# Patient Record
Sex: MALE | Race: AMERICAN INDIAN OR ALASKA NATIVE | NOT HISPANIC OR LATINO
[De-identification: names, ages, dates, MRNs, and addresses within clinical notes are randomized per-mention and may not be internally consistent; named-entity substitution may affect disease eponyms.]

---

## 2022-01-31 ENCOUNTER — APPOINTMENT (OUTPATIENT)
Dept: COLORECTAL SURGERY | Facility: CLINIC | Age: 87
End: 2022-01-31
Payer: MEDICARE

## 2022-01-31 VITALS
SYSTOLIC BLOOD PRESSURE: 117 MMHG | HEART RATE: 98 BPM | WEIGHT: 105 LBS | DIASTOLIC BLOOD PRESSURE: 73 MMHG | BODY MASS INDEX: 16.48 KG/M2 | HEIGHT: 67 IN | TEMPERATURE: 98.8 F

## 2022-01-31 DIAGNOSIS — Z86.79 PERSONAL HISTORY OF OTHER DISEASES OF THE CIRCULATORY SYSTEM: ICD-10-CM

## 2022-01-31 DIAGNOSIS — Z78.9 OTHER SPECIFIED HEALTH STATUS: ICD-10-CM

## 2022-01-31 PROBLEM — Z00.00 ENCOUNTER FOR PREVENTIVE HEALTH EXAMINATION: Status: ACTIVE | Noted: 2022-01-31

## 2022-01-31 PROCEDURE — 99204 OFFICE O/P NEW MOD 45 MIN: CPT

## 2022-01-31 NOTE — ASSESSMENT
[FreeTextEntry1] : I have reviewed with the patient with a Chinese  the findings of his recent hospitalization and evaluation consistent with a potential small bowel obstruction secondary to adhesions.  However despite documented improvement in x-rays he has residual nausea and vomiting intermittently.  I recommended that he institute a low fiber diet.  In addition we will obtain a CT scan for further assessment of 2 nature and extent of residual obstruction.  I have discussed with the patient the possibility of needing operative intervention to address this adhesive disease.  However the patient is at high risk for surgical intervention given his coronary disease and potential malnutrition.  Attritional profile labs drawn today.\par \par A chinese  was utilized for the entire visit . All questions were addressed.\par

## 2022-01-31 NOTE — HISTORY OF PRESENT ILLNESS
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  Subsequent x-rays during hospitalization revealed resolution of obstruction.\par \par The patient presents today with complaints of intermittent nausea and vomiting.  Although improved over the preceding several days.  Denies significant abdominal pain.\par \par Past medical history is remarkable for coronary artery disease\par

## 2022-01-31 NOTE — PHYSICAL EXAM
[Abdomen Masses] : No abdominal masses [Abdomen Tenderness] : ~T No ~M abdominal tenderness [No HSM] : no hepatosplenomegaly [JVD] : no jugular venous distention  [Normal Breath Sounds] : Normal breath sounds [Normal Heart Sounds] : normal heart sounds [No Rash or Lesion] : No rash or lesion [Alert] : not alert [de-identified] : Thin frail

## 2022-02-02 LAB
ALBUMIN SERPL ELPH-MCNC: 3.9 G/DL
ALP BLD-CCNC: 49 U/L
ALT SERPL-CCNC: 10 U/L
ANION GAP SERPL CALC-SCNC: 12 MMOL/L
AST SERPL-CCNC: 20 U/L
BILIRUB SERPL-MCNC: 0.2 MG/DL
BUN SERPL-MCNC: 15 MG/DL
CALCIUM SERPL-MCNC: 9 MG/DL
CHLORIDE SERPL-SCNC: 100 MMOL/L
CO2 SERPL-SCNC: 25 MMOL/L
CREAT SERPL-MCNC: 1.06 MG/DL
GLUCOSE SERPL-MCNC: 100 MG/DL
POTASSIUM SERPL-SCNC: 4.5 MMOL/L
PROT SERPL-MCNC: 6.4 G/DL
SODIUM SERPL-SCNC: 137 MMOL/L

## 2022-02-03 ENCOUNTER — EMERGENCY (EMERGENCY)
Facility: HOSPITAL | Age: 87
LOS: 0 days | Discharge: HOME | End: 2022-02-04
Attending: EMERGENCY MEDICINE | Admitting: EMERGENCY MEDICINE
Payer: MEDICARE

## 2022-02-03 VITALS
TEMPERATURE: 100 F | RESPIRATION RATE: 18 BRPM | HEART RATE: 99 BPM | DIASTOLIC BLOOD PRESSURE: 57 MMHG | WEIGHT: 115.08 LBS | SYSTOLIC BLOOD PRESSURE: 112 MMHG | OXYGEN SATURATION: 100 %

## 2022-02-03 DIAGNOSIS — E78.5 HYPERLIPIDEMIA, UNSPECIFIED: ICD-10-CM

## 2022-02-03 DIAGNOSIS — Z90.49 ACQUIRED ABSENCE OF OTHER SPECIFIED PARTS OF DIGESTIVE TRACT: Chronic | ICD-10-CM

## 2022-02-03 DIAGNOSIS — K80.20 CALCULUS OF GALLBLADDER WITHOUT CHOLECYSTITIS WITHOUT OBSTRUCTION: ICD-10-CM

## 2022-02-03 DIAGNOSIS — Z02.9 ENCOUNTER FOR ADMINISTRATIVE EXAMINATIONS, UNSPECIFIED: ICD-10-CM

## 2022-02-03 DIAGNOSIS — R10.33 PERIUMBILICAL PAIN: ICD-10-CM

## 2022-02-03 DIAGNOSIS — I10 ESSENTIAL (PRIMARY) HYPERTENSION: ICD-10-CM

## 2022-02-03 LAB
ALBUMIN SERPL ELPH-MCNC: 3.3 G/DL — LOW (ref 3.5–5.2)
ALP SERPL-CCNC: 41 U/L — SIGNIFICANT CHANGE UP (ref 30–115)
ALT FLD-CCNC: 8 U/L — SIGNIFICANT CHANGE UP (ref 0–41)
ANION GAP SERPL CALC-SCNC: 11 MMOL/L — SIGNIFICANT CHANGE UP (ref 7–14)
ANISOCYTOSIS BLD QL: SLIGHT — SIGNIFICANT CHANGE UP
APPEARANCE UR: CLEAR — SIGNIFICANT CHANGE UP
AST SERPL-CCNC: 16 U/L — SIGNIFICANT CHANGE UP (ref 0–41)
BASOPHILS # BLD AUTO: 0.04 K/UL — SIGNIFICANT CHANGE UP (ref 0–0.2)
BASOPHILS NFR BLD AUTO: 0.9 % — SIGNIFICANT CHANGE UP (ref 0–1)
BILIRUB SERPL-MCNC: 0.2 MG/DL — SIGNIFICANT CHANGE UP (ref 0.2–1.2)
BILIRUB UR-MCNC: NEGATIVE — SIGNIFICANT CHANGE UP
BUN SERPL-MCNC: 38 MG/DL — HIGH (ref 10–20)
BURR CELLS BLD QL SMEAR: PRESENT — SIGNIFICANT CHANGE UP
CALCIUM SERPL-MCNC: 8.7 MG/DL — SIGNIFICANT CHANGE UP (ref 8.5–10.1)
CHLORIDE SERPL-SCNC: 100 MMOL/L — SIGNIFICANT CHANGE UP (ref 98–110)
CO2 SERPL-SCNC: 21 MMOL/L — SIGNIFICANT CHANGE UP (ref 17–32)
COLOR SPEC: SIGNIFICANT CHANGE UP
CREAT SERPL-MCNC: 1.1 MG/DL — SIGNIFICANT CHANGE UP (ref 0.7–1.5)
DIFF PNL FLD: NEGATIVE — SIGNIFICANT CHANGE UP
ELLIPTOCYTES BLD QL SMEAR: SLIGHT — SIGNIFICANT CHANGE UP
EOSINOPHIL # BLD AUTO: 0 K/UL — SIGNIFICANT CHANGE UP (ref 0–0.7)
EOSINOPHIL NFR BLD AUTO: 0 % — SIGNIFICANT CHANGE UP (ref 0–8)
GIANT PLATELETS BLD QL SMEAR: PRESENT — SIGNIFICANT CHANGE UP
GLUCOSE SERPL-MCNC: 102 MG/DL — HIGH (ref 70–99)
GLUCOSE UR QL: NEGATIVE — SIGNIFICANT CHANGE UP
HCT VFR BLD CALC: 27.6 % — LOW (ref 42–52)
HGB BLD-MCNC: 8.4 G/DL — LOW (ref 14–18)
HYPOCHROMIA BLD QL: SIGNIFICANT CHANGE UP
HYPOSEGMENTATION: PRESENT — SIGNIFICANT CHANGE UP
KETONES UR-MCNC: NEGATIVE — SIGNIFICANT CHANGE UP
LACTATE SERPL-SCNC: 1.7 MMOL/L — SIGNIFICANT CHANGE UP (ref 0.7–2)
LEUKOCYTE ESTERASE UR-ACNC: NEGATIVE — SIGNIFICANT CHANGE UP
LIDOCAIN IGE QN: 41 U/L — SIGNIFICANT CHANGE UP (ref 7–60)
LYMPHOCYTES # BLD AUTO: 0.35 K/UL — LOW (ref 1.2–3.4)
LYMPHOCYTES # BLD AUTO: 7.1 % — LOW (ref 20.5–51.1)
MANUAL SMEAR VERIFICATION: SIGNIFICANT CHANGE UP
MCHC RBC-ENTMCNC: 20.1 PG — LOW (ref 27–31)
MCHC RBC-ENTMCNC: 30.4 G/DL — LOW (ref 32–37)
MCV RBC AUTO: 66.2 FL — LOW (ref 80–94)
MONOCYTES # BLD AUTO: 0.71 K/UL — HIGH (ref 0.1–0.6)
MONOCYTES NFR BLD AUTO: 14.3 % — HIGH (ref 1.7–9.3)
MYELOCYTES NFR BLD: 1.8 % — HIGH (ref 0–0)
NEUTROPHILS # BLD AUTO: 3.78 K/UL — SIGNIFICANT CHANGE UP (ref 1.4–6.5)
NEUTROPHILS NFR BLD AUTO: 75.9 % — HIGH (ref 42.2–75.2)
NITRITE UR-MCNC: NEGATIVE — SIGNIFICANT CHANGE UP
PH UR: 6.5 — SIGNIFICANT CHANGE UP (ref 5–8)
PLAT MORPH BLD: NORMAL — SIGNIFICANT CHANGE UP
PLATELET # BLD AUTO: 159 K/UL — SIGNIFICANT CHANGE UP (ref 130–400)
POIKILOCYTOSIS BLD QL AUTO: SIGNIFICANT CHANGE UP
POLYCHROMASIA BLD QL SMEAR: SLIGHT — SIGNIFICANT CHANGE UP
POTASSIUM SERPL-MCNC: 5.8 MMOL/L — HIGH (ref 3.5–5)
POTASSIUM SERPL-SCNC: 5.8 MMOL/L — HIGH (ref 3.5–5)
PREALB SERPL NEPH-MCNC: 14 MG/DL
PROT SERPL-MCNC: 5.7 G/DL — LOW (ref 6–8)
PROT UR-MCNC: NEGATIVE — SIGNIFICANT CHANGE UP
RBC # BLD: 4.17 M/UL — LOW (ref 4.7–6.1)
RBC # FLD: 15.8 % — HIGH (ref 11.5–14.5)
RBC BLD AUTO: ABNORMAL
SCHISTOCYTES BLD QL AUTO: SLIGHT — SIGNIFICANT CHANGE UP
SODIUM SERPL-SCNC: 132 MMOL/L — LOW (ref 135–146)
SP GR SPEC: 1.02 — SIGNIFICANT CHANGE UP (ref 1.01–1.03)
UROBILINOGEN FLD QL: SIGNIFICANT CHANGE UP
WBC # BLD: 4.98 K/UL — SIGNIFICANT CHANGE UP (ref 4.8–10.8)
WBC # FLD AUTO: 4.98 K/UL — SIGNIFICANT CHANGE UP (ref 4.8–10.8)

## 2022-02-03 PROCEDURE — 99284 EMERGENCY DEPT VISIT MOD MDM: CPT

## 2022-02-03 PROCEDURE — 74177 CT ABD & PELVIS W/CONTRAST: CPT | Mod: 26,MA

## 2022-02-03 PROCEDURE — 71045 X-RAY EXAM CHEST 1 VIEW: CPT | Mod: 26

## 2022-02-03 RX ORDER — ONDANSETRON 8 MG/1
4 TABLET, FILM COATED ORAL ONCE
Refills: 0 | Status: COMPLETED | OUTPATIENT
Start: 2022-02-03 | End: 2022-02-03

## 2022-02-03 RX ORDER — IOHEXOL 300 MG/ML
30 INJECTION, SOLUTION INTRAVENOUS ONCE
Refills: 0 | Status: COMPLETED | OUTPATIENT
Start: 2022-02-03 | End: 2022-02-03

## 2022-02-03 RX ORDER — SODIUM CHLORIDE 9 MG/ML
1000 INJECTION INTRAMUSCULAR; INTRAVENOUS; SUBCUTANEOUS ONCE
Refills: 0 | Status: COMPLETED | OUTPATIENT
Start: 2022-02-03 | End: 2022-02-03

## 2022-02-03 RX ADMIN — IOHEXOL 30 MILLILITER(S): 300 INJECTION, SOLUTION INTRAVENOUS at 16:16

## 2022-02-03 RX ADMIN — ONDANSETRON 4 MILLIGRAM(S): 8 TABLET, FILM COATED ORAL at 16:16

## 2022-02-03 RX ADMIN — SODIUM CHLORIDE 1000 MILLILITER(S): 9 INJECTION INTRAMUSCULAR; INTRAVENOUS; SUBCUTANEOUS at 16:16

## 2022-02-03 RX ADMIN — ONDANSETRON 4 MILLIGRAM(S): 8 TABLET, FILM COATED ORAL at 16:41

## 2022-02-03 NOTE — ED PROVIDER NOTE - NSFOLLOWUPINSTRUCTIONS_ED_ALL_ED_FT
Please follow up with your primary care physician within 24-72 hours and return immediately if symptoms worsen.    Cholelithiasis    Cholelithiasis is a form of gallbladder disease in which gallstones form in the gallbladder. The gallbladder is an organ that stores bile. Bile is made in the liver, and it helps to digest fats. Gallstones begin as small crystals and slowly grow into stones. They may cause no symptoms until the gallbladder tightens (contracts) and a gallstone is blocking the duct (gallbladder attack), which can cause pain. Cholelithiasis is also referred to as gallstones.  There are two main types of gallstones:  Cholesterol stones. These are made of hardened cholesterol and are usually yellow-green in color. They are the most common type of gallstone. Cholesterol is a white, waxy, fat-like substance that is made in the liver. Pigment stones. These are dark in color and are made of a red-yellow substance that forms when hemoglobin from red blood cells breaks down (bilirubin).What are the causes?  This condition may be caused by an imbalance in the substances that bile is made of. This can happen if the bile:  Has too much bilirubin. Has too much cholesterol. Does not have enough bile salts. These salts help the body absorb and digest fats. In some cases, this condition can also be caused by the gallbladder not emptying completely or often enough.  What increases the risk?  The following factors may make you more likely to develop this condition:  Being female. Having multiple pregnancies. Health care providers sometimes advise removing diseased gallbladders before future pregnancies. Eating a diet that is heavy in fried foods, fat, and refined carbohydrates, like white bread and white rice. Being obese. Being older than age 40.Prolonged use of medicines that contain female hormones (estrogen).Having diabetes mellitus. Rapidly losing weight. Having a family history of gallstones. Being of  or [place] descent. Having an intestinal disease such as Crohn disease. Having metabolic syndrome. Having cirrhosis. Having severe types of anemia such as sickle cell anemia. What are the signs or symptoms?  In most cases, there are no symptoms. These are known as silent gallstones. If a gallstone blocks the bile ducts, it can cause a gallbladder attack. The main symptom of a gallbladder attack is sudden pain in the upper right abdomen. The pain usually comes at night or after eating a large meal. The pain can last for one or several hours and can spread to the right shoulder or chest.  If the bile duct is blocked for more than a few hours, it can cause infection or inflammation of the gallbladder, liver, or pancreas, which may cause:  Nausea. Vomiting. Abdominal pain that lasts for 5 hours or more. Fever or chills. Yellowing of the skin or the whites of the eyes (jaundice).Dark urine. Light-colored stools. How is this diagnosed?  This condition may be diagnosed based on:  A physical exam. Your medical history. An ultrasound of your gallbladder. CT scan. MRI. Blood tests to check for signs of infection or inflammation.A scan of your gallbladder and bile ducts (biliary system) using nonharmful radioactive material and special cameras that can see the radioactive material (cholescintigram). This test checks to see how your gallbladder contracts and whether bile ducts are blocked.Inserting a small tube with a camera on the end (endoscope) through your mouth to inspect bile ducts and check for blockages (endoscopic retrograde cholangiopancreatogram).How is this treated?  Treatment for gallstones depends on the severity of the condition. Silent gallstones do not need treatment. If the gallstones cause a gallbladder attack or other symptoms, treatment may be required. Options for treatment include:  Surgery to remove the gallbladder (cholecystectomy). This is the most common treatment.Medicines to dissolve gallstones. These are most effective at treating small gallstones. You may need to take medicines for up to 6–12 months.Shock wave treatment (extracorporeal biliary lithotripsy). In this treatment, an ultrasound machine sends shock waves to the gallbladder to break gallstones into smaller pieces. These pieces can then be passed into the intestines or be dissolved by medicine. This is rarely used.Removing gallstones through endoscopic retrograde cholangiopancreatogram. A small basket can be attached to the endoscope and used to capture and remove gallstones.Follow these instructions at home:  Take over-the-counter and prescription medicines only as told by your health care provider.Maintain a healthy weight and follow a healthy diet. This includes:  Reducing fatty foods, such as fried food.Reducing refined carbohydrates, like white bread and white rice.Increasing fiber. Aim for foods like almonds, fruit, and beans.Keep all follow-up visits as told by your health care provider. This is important.Contact a health care provider if:  You think you have had a gallbladder attack.You have been diagnosed with silent gallstones and you develop abdominal pain or indigestion.Get help right away if:  You have pain from a gallbladder attack that lasts for more than 2 hours.You have abdominal pain that lasts for more than 5 hours.You have a fever or chills.You have persistent nausea and vomiting.You develop jaundice.You have dark urine or light-colored stools.Summary  Cholelithiasis (also called gallstones) is a form of gallbladder disease in which gallstones form in the gallbladder.This condition is caused by an imbalance in the substances that make up bile. This can happen if the bile has too much cholesterol, too much bilirubin, or not enough bile salts.You are more likely to develop this condition if you are female, pregnant, using medicines with estrogen, obese, older than age 40, or have a family history of gallstones. You may also develop gallstones if you have diabetes, an intestinal disease, cirrhosis, or metabolic syndrome.Treatment for gallstones depends on the severity of the condition. Silent gallstones do not need treatment.If gallstones cause a gallbladder attack or other symptoms, treatment may be needed. The most common treatment is surgery to remove the gallbladder.This information is not intended to replace advice given to you by your health care provider. Make sure you discuss any questions you have with your health care provider.

## 2022-02-03 NOTE — ED PROVIDER NOTE - NS ED ROS FT
Constitutional: (-) fever  Eyes/ENT: (-) visual changes   Cardiovascular: (-) chest pain, (-) syncope  Respiratory: (-) cough, (-) shortness of breath  Gastrointestinal: (+) vomiting, (-) diarrhea  Genitourinary: (-) dysuria, (-) hesitancy, (-) frequency   Musculoskeletal: (-) neck pain, (-) back pain, (-) joint pain  Integumentary: (-) rash, (-) edema  Neurological: (-) headache, (-) altered mental status  Allergic/Immunologic: (-) pruritus

## 2022-02-03 NOTE — ED ADULT NURSE NOTE - ED STAT RN HANDOFF DETAILS
pt was seen and treated for vomiting x 2 days and r/o bowel obstruction. pt is clear for discharge home. granddaughter naresh called [employer] for pt to go home. md spoke with family member naresh about discharge plan and care. (pt bibems from home sent in by catarina gaxiola MD to see MD green for rule out bowel obstruction, vomiting started 2 days ago. pt with home health aide.)

## 2022-02-03 NOTE — ED PROVIDER NOTE - PHYSICAL EXAMINATION
Gen: NAD, AOx3  Head: NCAT  HEENT: PERRL, oral mucosa moist, normal conjunctiva, oropharynx clear without exudate or erythema  Lung: CTAB, no respiratory distress, no wheezing, rales, rhonchi  CV: normal s1/s2, rrr, Normal perfusion, pulses 2+ throughout  Abd: soft, umbilical tenderness, ND, no CVA tenderness  Genitourinary: no pelvic tenderness  MSK: No edema, no visible deformities, full range of motion in all 4 extremities  Neuro: CN II-XII grossly intact, No focal neurologic deficits  Skin: No rash   Psych: normal affect

## 2022-02-03 NOTE — ED PROVIDER NOTE - PATIENT PORTAL LINK FT
You can access the FollowMyHealth Patient Portal offered by [facility] by registering at the following website: [email or web address]. By joining [facility] [facility]’s FollowMyHealth portal, you will also be able to view your health information using other applications (apps) compatible with our system.

## 2022-02-03 NOTE — ED PROVIDER NOTE - OBJECTIVE STATEMENT
85 yo male with a pmh of HTN, HLD, BPH, and recent admission at [facility] for sbo presents c/o abdominal pain/n/v for 2 days. pt states his last BM was yesterday and he has not been able to tolerate PO. pt states to have constant umbilical pain described as sharp in nature. pt denies any other symptoms including fevers, chill, headache, recent illness/travel, cough, chest pain, or SOB.      ID# [id number]

## 2022-02-03 NOTE — ED PROVIDER NOTE - ATTENDING CONTRIBUTION TO CARE
85 y/o male h/o HTN, HLD, BPH, recent SBO, appendectomy p/w abdominal pain x yesterday, persistent, denies radiation / modifying factors, + nbnb vomit last bowel movement was yesterday, denies fever, cough, respiratory sx, urinary sx or other associated complaints at present.     No old chart available for review.  I have reviewed and agree with the initial nursing note, except as documented in my note.    VSS, awake, alert, non-toxic appearing, lying comfortably in stretcher, in NAD, ears clear, no scleral icterus, oropharynx clear, mmm, no JVD or bruit, no jaundice, skin rash or lesions, chest CTAB, non-labored breathing, no w/r/r, +S1/S2, RRR, no m/r/g, abdomen soft, NT, ND, +BS, no scars, hernias or distention, no pulsatile masses or bruits appreciated, no CVA tenderness, no peripheral edema or deformities, alert, clear speech and steady gait.

## 2022-02-03 NOTE — ED PROVIDER NOTE - CARE PROVIDER_API CALL
Jr Sosa (MD)  Gastroenterology  [address] [place] [address]  Phone: [phone]  Fax: [phone]  Follow Up Time: 1-3 Days

## 2022-02-03 NOTE — ED ADULT NURSE NOTE - CHIEF COMPLAINT QUOTE
pt bibems from home sent in by catarina gaxiola MD to see MD green for rule out bowel obstruction, vomiting started 2 days ago.pt with home health aide

## 2022-02-03 NOTE — ED ADULT TRIAGE NOTE - CHIEF COMPLAINT QUOTE
pt bibems from home sent in by catarina gaxiola MD to see MD green for rule out bowel obstruction, vomiting started 2 days ago pt bibems from home sent in by catarina gaxiola MD to see MD green for rule out bowel obstruction, vomiting started 2 days ago.pt with home health aide

## 2022-02-03 NOTE — ED PROVIDER NOTE - CLINICAL SUMMARY MEDICAL DECISION MAKING FREE TEXT BOX
pt s/o to me from Dr. Hopkins- labs imaging reviewed. Pt feeling improved, tolerating PO, abdomen soft, non-tender, non-distended, no rebound, no guarding. Aware of all results, given a copy of all available results, comfortable with discharge and follow-up outpatient, strict return precautions given. Endorses understanding of all of this and aware that they can return at any time for new or concerning symptoms. No further questions or concerns at this time

## 2022-02-03 NOTE — ED ADULT NURSE NOTE - NSIMPLEMENTINTERV_GEN_ALL_ED
Implemented All Fall with Harm Risk Interventions:  [place] to call system. Call bell, personal items and telephone within reach. Instruct patient to call for assistance. Room bathroom lighting operational. Non-slip footwear when patient is off stretcher. Physically safe environment: no spills, clutter or unnecessary equipment. Stretcher in lowest position, wheels locked, appropriate side rails in place. Provide visual cue, wrist band, yellow gown, etc. Monitor gait and stability. Monitor for mental status changes and reorient to person, place, and time. Review medications for side effects contributing to fall risk. Reinforce activity limits and safety measures with patient and family. Provide visual clues: red socks.

## 2022-02-03 NOTE — ED PROVIDER NOTE - NSICDXPASTMEDICALHX_GEN_ALL_CORE_FT
PAST MEDICAL HISTORY:  BPH (benign prostatic hyperplasia)     HLD (hyperlipidemia)     HTN (hypertension)     SBO (small bowel obstruction)

## 2022-02-04 VITALS
SYSTOLIC BLOOD PRESSURE: 114 MMHG | OXYGEN SATURATION: 100 % | TEMPERATURE: 99 F | RESPIRATION RATE: 16 BRPM | DIASTOLIC BLOOD PRESSURE: 58 MMHG | HEART RATE: 89 BPM

## 2022-02-04 PROCEDURE — 99283 EMERGENCY DEPT VISIT LOW MDM: CPT

## 2022-02-04 RX ORDER — ONDANSETRON 8 MG/1
1 TABLET, FILM COATED ORAL
Qty: 10 | Refills: 0
Start: 2022-02-04 | End: 2022-02-08

## 2022-02-04 NOTE — CONSULT NOTE ADULT - ATTENDING COMMENTS
Patient is an 86M with PMH of HTN, HLD, BPH, surgical hx of unknown gastric surgery, recent admission at [facility] for sbo    vitals labs and images reviewed    CTAP  Cholelithiasis and moderate intrahepatic and CBD dilatation. Further   evaluation with MRI/MRCP may be of benefit.    Focal diminished attenuation of the left lower renal cortex. This may   reflect evidence of pyelonephritis in the appropriate clinical setting.   This may also reflect sequela of prior insult.    Mild right hydronephrosis and proximal hydroureter. No definite   obstructing calculus is seen with mid and distal right ureter appearing   normal in caliber    Enlarged heterogeneous prostate    9 mm nodule in the right lower lobe. Nonemergent PET CT may be of benefit    Tree-in-bud nodularity in the right middle and lower lobes may reflect   sequela of an infectious process.    PLAN:  - No SBO on images  - patient may benefit from admission and EGD to rule out partial GOO

## 2022-02-04 NOTE — CONSULT NOTE ADULT - ASSESSMENT
ASSESSMENT:  85 yo male with a pmh of HTN, HLD, BPH, surgical hx of unknown gastric surgery VIA midline laparotomy incision, and recent admission at [facility] for sbo presents c/o abdominal pain/n/v for 2 days. Physical exam findings, imaging, and labs as documented above.   No signs of bowel obstruction    PLAN:  - Recommend NGT for decompression of stomach  - Recommend GI for endoscopic evaluation of stomach/ GOO      Above plan discussed with Attending Surgeon Dr. Silva  , patient, patient family, and Primary team  02-04-22 @ 00:36   ASSESSMENT:  87 yo male with a pmh of HTN, HLD, BPH, surgical hx of unknown gastric surgery VIA midline laparotomy incision, and recent admission at [facility] for sbo presents c/o abdominal pain/n/v for 2 days. Physical exam findings, imaging, and labs as documented above.   No signs of bowel obstruction    PLAN:  - Recommend GI for endoscopic evaluation of stomach/ GOO given surgical history      Above plan discussed with Attending Surgeon Dr. Silva  , patient, patient family, and Primary team  02-04-22 @ 00:36   ASSESSMENT:  87 yo male with a pmh of HTN, HLD, BPH, surgical hx of unknown gastric surgery VIA midline laparotomy incision, and recent admission at [facility] for sbo presents c/o abdominal pain/n/v for 2 days. Physical exam findings, imaging, and labs as documented above.   No signs of bowel obstruction    PLAN:  - Recommend GI for endoscopic evaluation of stomach/ GOO workup given surgical history      Above plan discussed with Attending Surgeon Dr. Silva  , patient, patient family, and Primary team  02-04-22 @ 00:36

## 2022-02-04 NOTE — CONSULT NOTE ADULT - SUBJECTIVE AND OBJECTIVE BOX
GENERAL SURGERY CONSULT NOTE    Patient: LUCIA OVALLES , 86y (35)Male   MRN: [id number]  Location: [facility] ED  Visit: 22 Emergency  Date: 22 @ 00:36    HPI:   87 yo male with a pmh of HTN, HLD, BPH, surgical hx of unknown gastric surgery VIA midline laparotomy incision, and recent admission at [facility] for sbo presents c/o abdominal pain/n/v for 2 days. He states that he has not been able to keep down foods for the past 2-3 days, with associated bilious vomiting. Passing flatus this morning, last bowel movement 1 day prior.    PAST MEDICAL & SURGICAL HISTORY:  HTN (hypertension)    HLD (hyperlipidemia)    BPH (benign prostatic hyperplasia)    SBO (small bowel obstruction)    History of appendectomy        Home Medications:        VITALS:  T(F): 99 (22 @ 23:28), Max: 99.5 (22 @ 14:38)  HR: 89 (22 @ 23:28) (89 - 99)  BP: 116/58 (22 @ 23:28) (112/57 - 116/58)  RR: 16 (22 @ 23:28) (16 - 18)  SpO2: 100% (22 @ 23:28) (100% - 100%)    PHYSICAL EXAM:  General: NAD, AAOx3, calm and cooperative  HEENT: NCAT, EVER, EOMI, Trachea ML, Neck supple  Cardiac: RRR S1, S2, no Murmurs, rubs or gallops  Respiratory: CTAB, normal respiratory efforts  Abdomen: Soft, non-distended, non-tender, no rebound, no guarding. +BS.  Neuro: Sensation grossly intact and equal throughout, no focal deficits  Incision/wound: healing well, dressings in place, clean, dry and intact    MEDICATIONS  (STANDING):    MEDICATIONS  (PRN):      LAB/STUDIES:                        8.4    4.98  )-----------( 159      ( 2022 16:36 )             27.6     02-    132<L>  |  100  |  38<H>  ----------------------------<  102<H>  5.8<H>   |  21  |  1.1    Ca    8.7      2022 16:36    TPro  5.7<L>  /  Alb  3.3<L>  /  TBili  0.2  /  DBili  x   /  AST  16  /  ALT  8   /  AlkPhos  41  02-03      LIVER FUNCTIONS - ( 2022 16:36 )  Alb: 3.3 g/dL / Pro: 5.7 g/dL / ALK PHOS: 41 U/L / ALT: 8 U/L / AST: 16 U/L / GGT: x           Urinalysis Basic - ( 2022 19:00 )    Color: Light Yellow / Appearance: Clear / S.016 / pH: x  Gluc: x / Ketone: Negative  / Bili: Negative / Urobili: <2 mg/dL   Blood: x / Protein: Negative / Nitrite: Negative   Leuk Esterase: Negative / RBC: x / WBC x   Sq Epi: x / Non Sq Epi: x / Bacteria: x    IMAGING:    < from: CT Abdomen and Pelvis w/ Oral Cont and w/ IV Cont (22 @ 22:06) >      IMPRESSION:      Cholelithiasis and moderate intrahepatic and CBD dilatation. Further   evaluation with MRI/MRCP may be of benefit.    Focal diminished attenuation of the left lower renal cortex. This may   reflect evidence of pyelonephritis in the appropriate clinical setting.   This may also reflect sequela of prior insult.    Mild right hydronephrosis and proximal hydroureter. No definite   obstructing calculus is seen with mid and distal right ureter appearing   normal in caliber    Enlarged heterogeneous prostate    9 mm nodule in the right lower lobe. Nonemergent PET CT may be of benefit    Tree-in-bud nodularity in the right middle and lower lobes may reflect   sequela of an infectious process.      --- End of Report ---        < end of copied text >

## 2022-02-05 ENCOUNTER — INPATIENT (INPATIENT)
Facility: HOSPITAL | Age: 87
LOS: 5 days | Discharge: HOME HEALTH | End: 2022-02-11
Attending: COLON & RECTAL SURGERY | Admitting: COLON & RECTAL SURGERY
Payer: MEDICARE

## 2022-02-05 VITALS
SYSTOLIC BLOOD PRESSURE: 70 MMHG | HEART RATE: 91 BPM | TEMPERATURE: 98 F | OXYGEN SATURATION: 100 % | RESPIRATION RATE: 18 BRPM | DIASTOLIC BLOOD PRESSURE: 40 MMHG

## 2022-02-05 DIAGNOSIS — Z90.49 ACQUIRED ABSENCE OF OTHER SPECIFIED PARTS OF DIGESTIVE TRACT: Chronic | ICD-10-CM

## 2022-02-05 PROBLEM — E78.5 HYPERLIPIDEMIA, UNSPECIFIED: Chronic | Status: ACTIVE | Noted: 2022-02-03

## 2022-02-05 PROBLEM — K56.609 UNSPECIFIED INTESTINAL OBSTRUCTION, UNSPECIFIED AS TO PARTIAL VERSUS COMPLETE OBSTRUCTION: Chronic | Status: ACTIVE | Noted: 2022-02-03

## 2022-02-05 PROBLEM — I10 ESSENTIAL (PRIMARY) HYPERTENSION: Chronic | Status: ACTIVE | Noted: 2022-02-03

## 2022-02-05 PROBLEM — N40.0 BENIGN PROSTATIC HYPERPLASIA WITHOUT LOWER URINARY TRACT SYMPTOMS: Chronic | Status: ACTIVE | Noted: 2022-02-03

## 2022-02-05 LAB
ABO RH CONFIRMATION: SIGNIFICANT CHANGE UP
ALBUMIN SERPL ELPH-MCNC: 3.2 G/DL — LOW (ref 3.5–5.2)
ALP SERPL-CCNC: 39 U/L — SIGNIFICANT CHANGE UP (ref 30–115)
ALT FLD-CCNC: 8 U/L — SIGNIFICANT CHANGE UP (ref 0–41)
ANION GAP SERPL CALC-SCNC: 9 MMOL/L — SIGNIFICANT CHANGE UP (ref 7–14)
APTT BLD: 33 SEC — SIGNIFICANT CHANGE UP (ref 27–39.2)
AST SERPL-CCNC: 19 U/L — SIGNIFICANT CHANGE UP (ref 0–41)
BASOPHILS # BLD AUTO: 0 K/UL — SIGNIFICANT CHANGE UP (ref 0–0.2)
BASOPHILS NFR BLD AUTO: 0 % — SIGNIFICANT CHANGE UP (ref 0–1)
BILIRUB SERPL-MCNC: <0.2 MG/DL — SIGNIFICANT CHANGE UP (ref 0.2–1.2)
BLD GP AB SCN SERPL QL: SIGNIFICANT CHANGE UP
BUN SERPL-MCNC: 38 MG/DL — HIGH (ref 10–20)
CALCIUM SERPL-MCNC: 8.1 MG/DL — LOW (ref 8.5–10.1)
CHLORIDE SERPL-SCNC: 108 MMOL/L — SIGNIFICANT CHANGE UP (ref 98–110)
CO2 SERPL-SCNC: 21 MMOL/L — SIGNIFICANT CHANGE UP (ref 17–32)
CREAT SERPL-MCNC: 1.1 MG/DL — SIGNIFICANT CHANGE UP (ref 0.7–1.5)
ELLIPTOCYTES BLD QL SMEAR: SLIGHT — SIGNIFICANT CHANGE UP
EOSINOPHIL # BLD AUTO: 0.4 K/UL — SIGNIFICANT CHANGE UP (ref 0–0.7)
EOSINOPHIL NFR BLD AUTO: 6 % — SIGNIFICANT CHANGE UP (ref 0–8)
GLUCOSE SERPL-MCNC: 115 MG/DL — HIGH (ref 70–99)
HCT VFR BLD CALC: 17 % — LOW (ref 42–52)
HGB BLD-MCNC: 5.4 G/DL — CRITICAL LOW (ref 14–18)
HYPOCHROMIA BLD QL: SIGNIFICANT CHANGE UP
INR BLD: 0.98 RATIO — SIGNIFICANT CHANGE UP (ref 0.65–1.3)
IRON SATN MFR SERPL: 107 UG/DL — SIGNIFICANT CHANGE UP (ref 35–150)
IRON SATN MFR SERPL: 61 % — HIGH (ref 15–50)
LDH SERPL L TO P-CCNC: 265 U/L — HIGH (ref 50–242)
LYMPHOCYTES # BLD AUTO: 1.07 K/UL — LOW (ref 1.2–3.4)
LYMPHOCYTES # BLD AUTO: 16 % — LOW (ref 20.5–51.1)
MACROCYTES BLD QL: SLIGHT — SIGNIFICANT CHANGE UP
MANUAL SMEAR VERIFICATION: SIGNIFICANT CHANGE UP
MCHC RBC-ENTMCNC: 20.8 PG — LOW (ref 27–31)
MCHC RBC-ENTMCNC: 31.8 G/DL — LOW (ref 32–37)
MCV RBC AUTO: 65.6 FL — LOW (ref 80–94)
MONOCYTES # BLD AUTO: 0.6 K/UL — SIGNIFICANT CHANGE UP (ref 0.1–0.6)
MONOCYTES NFR BLD AUTO: 9 % — SIGNIFICANT CHANGE UP (ref 1.7–9.3)
MYELOCYTES NFR BLD: 2 % — HIGH (ref 0–0)
NEUTROPHILS # BLD AUTO: 4.42 K/UL — SIGNIFICANT CHANGE UP (ref 1.4–6.5)
NEUTROPHILS NFR BLD AUTO: 65 % — SIGNIFICANT CHANGE UP (ref 42.2–75.2)
NEUTS BAND # BLD: 1 % — SIGNIFICANT CHANGE UP (ref 0–6)
NRBC # BLD: 0 /100 — SIGNIFICANT CHANGE UP (ref 0–0)
NRBC # BLD: SIGNIFICANT CHANGE UP /100 WBCS (ref 0–0)
NT-PROBNP SERPL-SCNC: 449 PG/ML — HIGH (ref 0–300)
PLAT MORPH BLD: NORMAL — SIGNIFICANT CHANGE UP
PLATELET # BLD AUTO: 148 K/UL — SIGNIFICANT CHANGE UP (ref 130–400)
POTASSIUM SERPL-MCNC: 4.4 MMOL/L — SIGNIFICANT CHANGE UP (ref 3.5–5)
POTASSIUM SERPL-SCNC: 4.4 MMOL/L — SIGNIFICANT CHANGE UP (ref 3.5–5)
PROMYELOCYTES # FLD: 1 % — HIGH (ref 0–0)
PROT SERPL-MCNC: 5.1 G/DL — LOW (ref 6–8)
PROTHROM AB SERPL-ACNC: 11.3 SEC — SIGNIFICANT CHANGE UP (ref 9.95–12.87)
RBC # BLD: 2.59 M/UL — LOW (ref 4.7–6.1)
RBC # FLD: 15.9 % — HIGH (ref 11.5–14.5)
RBC BLD AUTO: ABNORMAL
SARS-COV-2 RNA SPEC QL NAA+PROBE: SIGNIFICANT CHANGE UP
SODIUM SERPL-SCNC: 138 MMOL/L — SIGNIFICANT CHANGE UP (ref 135–146)
TARGETS BLD QL SMEAR: SLIGHT — SIGNIFICANT CHANGE UP
TIBC SERPL-MCNC: 175 UG/DL — LOW (ref 220–430)
TRANSFERRIN SERPL-MCNC: 121 MG/DL — LOW (ref 200–360)
TRIGL SERPL-MCNC: 136 MG/DL — SIGNIFICANT CHANGE UP
TROPONIN T SERPL-MCNC: <0.01 NG/ML — SIGNIFICANT CHANGE UP
UIBC SERPL-MCNC: 68 UG/DL — LOW (ref 110–370)
WBC # BLD: 6.7 K/UL — SIGNIFICANT CHANGE UP (ref 4.8–10.8)
WBC # FLD AUTO: 6.7 K/UL — SIGNIFICANT CHANGE UP (ref 4.8–10.8)

## 2022-02-05 PROCEDURE — 71260 CT THORAX DX C+: CPT | Mod: 26,MG

## 2022-02-05 PROCEDURE — G1004: CPT

## 2022-02-05 PROCEDURE — 71045 X-RAY EXAM CHEST 1 VIEW: CPT | Mod: 26

## 2022-02-05 PROCEDURE — 99223 1ST HOSP IP/OBS HIGH 75: CPT | Mod: GC

## 2022-02-05 PROCEDURE — 99291 CRITICAL CARE FIRST HOUR: CPT

## 2022-02-05 PROCEDURE — 93010 ELECTROCARDIOGRAM REPORT: CPT

## 2022-02-05 PROCEDURE — 83020 HEMOGLOBIN ELECTROPHORESIS: CPT | Mod: 26

## 2022-02-05 PROCEDURE — 74176 CT ABD & PELVIS W/O CONTRAST: CPT | Mod: 26

## 2022-02-05 RX ORDER — ONDANSETRON 8 MG/1
0 TABLET, FILM COATED ORAL
Qty: 0 | Refills: 0 | DISCHARGE

## 2022-02-05 RX ORDER — QUETIAPINE FUMARATE 200 MG/1
0 TABLET, FILM COATED ORAL
Qty: 0 | Refills: 0 | DISCHARGE

## 2022-02-05 RX ORDER — DUTASTERIDE 0.5 MG/1
0 CAPSULE, LIQUID FILLED ORAL
Qty: 0 | Refills: 0 | DISCHARGE

## 2022-02-05 RX ORDER — PANTOPRAZOLE SODIUM 20 MG/1
40 TABLET, DELAYED RELEASE ORAL
Refills: 0 | Status: DISCONTINUED | OUTPATIENT
Start: 2022-02-05 | End: 2022-02-11

## 2022-02-05 RX ORDER — CARBIDOPA AND LEVODOPA 25; 100 MG/1; MG/1
1 TABLET ORAL THREE TIMES A DAY
Refills: 0 | Status: DISCONTINUED | OUTPATIENT
Start: 2022-02-05 | End: 2022-02-11

## 2022-02-05 RX ORDER — ATENOLOL 25 MG/1
25 TABLET ORAL DAILY
Refills: 0 | Status: DISCONTINUED | OUTPATIENT
Start: 2022-02-05 | End: 2022-02-11

## 2022-02-05 RX ORDER — DUTASTERIDE 0.5 MG/1
1 CAPSULE, LIQUID FILLED ORAL
Qty: 0 | Refills: 0 | DISCHARGE

## 2022-02-05 RX ORDER — ALPRAZOLAM 0.25 MG
0 TABLET ORAL
Qty: 0 | Refills: 0 | DISCHARGE

## 2022-02-05 RX ORDER — TAMSULOSIN HYDROCHLORIDE 0.4 MG/1
0.4 CAPSULE ORAL AT BEDTIME
Refills: 0 | Status: DISCONTINUED | OUTPATIENT
Start: 2022-02-05 | End: 2022-02-11

## 2022-02-05 RX ORDER — SODIUM CHLORIDE 9 MG/ML
1000 INJECTION, SOLUTION INTRAVENOUS ONCE
Refills: 0 | Status: COMPLETED | OUTPATIENT
Start: 2022-02-05 | End: 2022-02-05

## 2022-02-05 RX ORDER — ALPRAZOLAM 0.25 MG
0.5 TABLET ORAL DAILY
Refills: 0 | Status: DISCONTINUED | OUTPATIENT
Start: 2022-02-05 | End: 2022-02-08

## 2022-02-05 RX ORDER — QUETIAPINE FUMARATE 200 MG/1
25 TABLET, FILM COATED ORAL DAILY
Refills: 0 | Status: DISCONTINUED | OUTPATIENT
Start: 2022-02-05 | End: 2022-02-11

## 2022-02-05 RX ORDER — FINASTERIDE 5 MG/1
5 TABLET, FILM COATED ORAL DAILY
Refills: 0 | Status: DISCONTINUED | OUTPATIENT
Start: 2022-02-05 | End: 2022-02-11

## 2022-02-05 RX ADMIN — CARBIDOPA AND LEVODOPA 1 TABLET(S): 25; 100 TABLET ORAL at 21:37

## 2022-02-05 RX ADMIN — SODIUM CHLORIDE 1000 MILLILITER(S): 9 INJECTION, SOLUTION INTRAVENOUS at 12:20

## 2022-02-05 RX ADMIN — TAMSULOSIN HYDROCHLORIDE 0.4 MILLIGRAM(S): 0.4 CAPSULE ORAL at 21:37

## 2022-02-05 NOTE — ED PROVIDER NOTE - PHYSICAL EXAMINATION
VITAL SIGNS: I have reviewed nursing notes and confirm.  CONSTITUTIONAL: thin elderly man in stretcher in NAD. awake, alert, answering questions appropriatley  SKIN: Warm dry, normal skin turgor  HEAD: NCAT  EYES: EOMI, PERRL, no scleral icterus, +pale conjunctivae bl  ENT: Moist mucous membranes, normal pharynx with no erythema or exudates  NECK: Supple; non tender. Full ROM.   CARD: RRR, S1/S2. radial pulses 2+ bl.  RESP: clear to ausculation b/l.  No rales, rhonchi, or wheezing.  ABD: soft, non-tender, non-distended, no rebound or guarding. +2cm area of ecchymosis in L midabdominal region, no tenderness to palpation, noted old surgical scar in same area, no erythema.  EXT: Full ROM, no bony tenderness, no pedal edema, no calf tenderness  NEURO: AOx2 to name and place only. answering all questions. moving all extremities spontaneously and equally.  PSYCH: Cooperative, appropriate. VITAL SIGNS: I have reviewed nursing notes and confirm.  CONSTITUTIONAL: thin elderly man in stretcher in NAD. awake, alert, answering questions appropriatley  SKIN: Warm dry, normal skin turgor  HEAD: NCAT  EYES: EOMI, PERRL, no scleral icterus, +pale conjunctivae bl  ENT: Moist mucous membranes, normal pharynx with no erythema or exudates  NECK: Supple; non tender. Full ROM.   CARD: RRR, S1/S2. radial pulses 2+ bl.  RESP: clear to ausculation b/l.  No rales, rhonchi, or wheezing.  ABD: soft, non-tender, non-distended, no rebound or guarding. +2cm area of ecchymosis in L midabdominal region, no tenderness to palpation, noted old surgical scar in same area, no erythema.  GI: rectal supervised by COLT Jimenez - negative for blood, no hemorrhoids, no fissures.  EXT: Full ROM, no bony tenderness, no pedal edema, no calf tenderness  NEURO: AOx2 to name and place only. answering all questions. moving all extremities spontaneously and equally.  PSYCH: Cooperative, appropriate.

## 2022-02-05 NOTE — ED PROVIDER NOTE - OBJECTIVE STATEMENT
86M PMHx HTN, HLD, BPH, B12 deficiency, previous bowel surgery BIBEMS for dizziness, sent to ED by PMD for low Hb result. Pt AOx2 in ED (name and place), hx obtained from pt and his daughter at bedside. Pt was seen in ED 2 days ago for abd pain, nausea, vomiting, had CT abd/pelv with PO/IV contrast at the time that showed cholelithiasis, was discharged with referral to his outpatient GI/PCP and with strict return precautions. Per daughter, pt saw PMD yesterday and had bloodwork done, received call this morning and was told his Hb is 6.0 and to go to ED for transfusion. Pt reports 2 weeks of generalized weakness and dizziness, denies syncope, falls, all trauma. Also reports nonspecific headache and general loss of appetite, unsure if he has lost weight. Denies chest pain, sob, abd pain, nausea, vomiting, diarrhea, blood in stools, melena, hemetemesis.

## 2022-02-05 NOTE — ED PROVIDER NOTE - NS ED ROS FT
Constitutional: see HPI  Eyes/ENT: (-) runny nose  Cardiovascular: (-) chest pain  Respiratory: (-) cough  Gastrointestinal: see HPI  : (-) dysuria   Musculoskeletal: (-) joint pain  Integumentary: (-) rash  Neurological: see HPI  Allergic/Immunologic: (-) pruritus

## 2022-02-05 NOTE — ED ADULT NURSE NOTE - OBJECTIVE STATEMENT
Patient is a 86 year old male sent in by gastro doctor for low hgb as family outpatient hgb 6.0- for the past week patients daughter states he has been weaker, dizziness and headaches. Denies bloody stools, nausea, vomiting, chest pain or SOB.

## 2022-02-05 NOTE — CONSULT NOTE ADULT - SUBJECTIVE AND OBJECTIVE BOX
NEUROLOGY CONSULT    Patient was seen and examined. Divehi  [id number] used for evaluation. Patient is alert and oriented to name, age and location. He complains of feeling cold and left knee pain. He does not remember he ever was diagnosed with parkinson's disease and does not recall if he follow ups with any neurology as outpatient.   HPI:  PATIENT PICKS UP PRESCRIPTIONS AT [facility], IT IS CLOSED. PLEASE DO MED REC IN THE AM    This is an 86 year old M with a PMx of an unclear abdominal sx history (has had abdominal surgery in [facility] many years ago and 1 in [place] about 15 years ago as well, but unknown which hospital), SBO managed medically at [facility] 1/2022, HTN, Questionable history of parkinson's disease (patient takes sinemet at home), BPH, B12 deficiency, GERD, presents for a two week history of generalized weakness and dizziness.  The patient has endorsed having trouble ambulating due to his fatigue.  He also endorses dizziness, which is exacerbated by standing up (granddaughter endorses history of hypotension).  Patient denies any bloody bowel movements or any episodes of hematemesis during his presentation.  Patient has endorsed abdominal pain that is cramping and feels as if his insides are "twisting" and it occurs intermittently and is moderate in severity and endorses an episode of NBNB prior to admission.  Patient endorses losing 1-2lbs in the 12 months and endorses a good appetite.  His granddaughter who is a neurology resident at [place] ([phone]) endorses that the patient has a history of orthostatic hypotension.  He has been prescribed sinemet due to incoordination in his gait, describing his gait similar to penguin.  Granddaughter denies history of hematologic issues in the family.  She is also concerned of polypharmacy as patient is on an anti-psychotic, two benzo medications along with sinemet. Patient denies chest pain, sob, fever, rigors, exertional dyspnea or dyspnea at rest.     ICU Vital Signs Last 24 Hrs  T(C): 36.9 (05 Feb 2022 18:15), Max: 37.3 (05 Feb 2022 12:00)  T(F): 98.4 (05 Feb 2022 18:15), Max: 99.1 (05 Feb 2022 12:00)  HR: 72 (05 Feb 2022 18:15) (72 - 91)  BP: 117/58 (05 Feb 2022 18:15) (70/40 - 124/54)  BP(mean): --  ABP: --  ABP(mean): --  RR: 16 (05 Feb 2022 18:15) (16 - 18)  SpO2: 100% (05 Feb 2022 18:15) (100% - 100%)    In the ED:  CBC demonstrated a Hgb downtrend from 8.4 to 5.4, CMP demonstrates  no electrolyte abnormalities and no evidence of ALI or OLLIE, Iron studies significant for RICHARD but ferritin is pending, , BUN 38/SCr 1.1.  CT Chest w/IV Contrast demonstrated  Bilateral centrilobular nodular opacities consistent with small   airways infection/inflammation nPulmonary nodules measuring up to 9 mm.Intrahepatic and extrahepatic biliary ductal dilatation with mild  dilatation of the pancreatic duct. Gastric wall thickening likely due to underdistention, CT Abdomen/Pelvis 2/3/22 demonstrated Cholelithiasis and moderate intrahepatic and CBD dilatation. Further   evaluation with MRI/MRCP may be of benefit. Focal diminished attenuation of the left lower renal cortex. This may   reflect evidence of pyelonephritis in the appropriate clinical setting.  This may also reflect sequela of prior insult. Mild right hydronephrosis and proximal hydroureter. No definite  obstructing calculus is seen with mid and distal right ureter appearing normal in caliber.  Patient is s/p 2U PRBC          (05 Feb 2022 19:03)     MEDICATIONS  Home Medications:  ALPRAZOLAM 0.5MG TAB: 1 tab(s) orally once a day (05 Feb 2022 19:39)  ATENOLOL 25MG TAB:  (05 Feb 2022 19:36)  CARBIDOPA/LEVODOPA 10-100MG TAB:  (05 Feb 2022 19:36)  DEXILANT 60MG DR CAP:  (05 Feb 2022 19:36)  DUTASTERIDE 0.5MG CAP: 1 cap(s) orally once a day (05 Feb 2022 19:36)  QUETIAPINE FUMARATE 25MG TAB: 1 tab(s) orally once a day (05 Feb 2022 19:38)  TAMSULOSIN HYDROCHLORIDE 0.4MG CAP:  (05 Feb 2022 19:36)    MEDICATIONS  (STANDING):  ALPRAZolam 0.5 milliGRAM(s) Oral daily  ATENolol  Tablet 25 milliGRAM(s) Oral daily  carbidopa/levodopa  10/100 1 Tablet(s) Oral three times a day  finasteride 5 milliGRAM(s) Oral daily  pantoprazole    Tablet 40 milliGRAM(s) Oral before breakfast  QUEtiapine 25 milliGRAM(s) Oral daily  tamsulosin 0.4 milliGRAM(s) Oral at bedtime    MEDICATIONS  (PRN):      FAMILY HISTORY:  No pertinent family history in first degree relatives      SOCIAL HISTORY: negative for tobacco, alcohol, or ilicit drug use.    Allergies    No Known Allergies    Intolerances        Physical exam:  Constitutional: conversant, no visual or auditory hallucination.   Eyes: Anicteric sclerae, pale conjunctivae, see below for CNs  Neck: trachea midline, FROM, supple, no thyromegaly or lymphadenopathy  Cardiovascular: Regular rate and rhythm   Pulmonary: Anterior breath sounds clear bilaterally  GI: Abdomen soft, non-distended, non-tender  Extremities: Radial and DP pulses +2, no edema    Neurologic:  -Mental status: Awake, alert, oriented to person, place, and time. slow speech rate with Hypophonia. Follows commands. Attention/concentration intact. Fund of knowledge appropriate.  -Cranial nerves:   II: Visual fields are full to confrontation.  III, IV, VI: Extraocular movements are intact without nystagmus. Pupils equally round and reactive to light  V:  Facial sensation V1-V3 equal and intact   VII: Face is symmetric with normal eye closure and smile, decreased blink rate. Facial expression is masked as well.   VIII: Hearing is bilaterally intact to finger rub  IX, X: Uvula not checked  XI: Head turning and shoulder shrug are intact.  XII: Tongue protrudes midline  Motor: Normal bulk and tone. No pronator drift. Strength bilateral upper extremity 5/5, bilateral lower extremities 5/5.  Finger tap decreased b/l, slight cogwheel rigidity of left wrist.   Sensation: Intact to light touch bilaterally. No neglect or extinction on double simultaneous testing.  Coordination: No dysmetria on finger-to-nose   Reflexes: Downgoing toes bilaterally   Gait: Deferred.  LABS:                        5.4    6.70  )-----------( 148      ( 05 Feb 2022 12:03 )             17.0     02-05    138  |  108  |  38<H>  ----------------------------<  115<H>  4.4   |  21  |  1.1    Ca    8.1<L>      05 Feb 2022 12:03    TPro  5.1<L>  /  Alb  3.2<L>  /  TBili  <0.2  /  DBili  x   /  AST  19  /  ALT  8   /  AlkPhos  39  02-05    Hemoglobin A1C:   Vitamin B12   PT/INR - ( 05 Feb 2022 12:03 )   PT: 11.30 sec;   INR: 0.98 ratio    PTT - ( 05 Feb 2022 12:03 )  PTT:33.0 sec  CAPILLARY BLOOD GLUCOSE  Microbiology:  RADIOLOGY, EKG AND ADDITIONAL TESTS: Reviewed.  < from: CT Chest w/ IV Cont (02.05.22 @ 14:30) >  IMPRESSION:    1. Bilateral centrilobular nodular opacities consistent with small   airways infection/inflammation.  2. Pulmonary nodules measuring up to 9 mm. PET/CT may be obtained after   acute symptoms resolve.  3. Intrahepatic and extrahepatic biliary ductal dilatation with mild   dilatation of the pancreatic duct. MRI with and without contrast   recommended for further evaluation.  4. Gastric wall thickening likely due to underdistention.  5. Nonspecific sclerotic lesion in the left seventh rib. This may also be   evaluated on follow-up PET/CT.      < end of copied text >

## 2022-02-05 NOTE — ED PROVIDER NOTE - DATE/TIME 4
----- Message from Ole Packer MD sent at 4/16/2019 12:15 PM CDT -----  Labs all stable, no changes.  Excellent control of diabetes.   05-Feb-2022 14:45

## 2022-02-05 NOTE — ED PROVIDER NOTE - CARE PLAN
1 Principal Discharge DX:	Severe anemia   Principal Discharge DX:	Severe anemia  Secondary Diagnosis:	Cachexia

## 2022-02-05 NOTE — H&P ADULT - NSHPLABSRESULTS_GEN_ALL_CORE
CBC Full  -  ( 05 Feb 2022 12:03 )  WBC Count : 6.70 K/uL  RBC Count : 2.59 M/uL  Hemoglobin : 5.4 g/dL  Hematocrit : 17.0 %  Platelet Count - Automated : 148 K/uL  Mean Cell Volume : 65.6 fL  Mean Cell Hemoglobin : 20.8 pg  Mean Cell Hemoglobin Concentration : 31.8 g/dL  Auto Neutrophil # : 4.42 K/uL  Auto Lymphocyte # : 1.07 K/uL  Auto Monocyte # : 0.60 K/uL  Auto Eosinophil # : 0.40 K/uL  Auto Basophil # : 0.00 K/uL  Auto Neutrophil % : 65.0 %  Auto Lymphocyte % : 16.0 %  Auto Monocyte % : 9.0 %  Auto Eosinophil % : 6.0 %  Auto Basophil % : 0.0 %  02-05    138  |  108  |  38<H>  ----------------------------<  115<H>  4.4   |  21  |  1.1    Ca    8.1<L>      05 Feb 2022 12:03    TPro  5.1<L>  /  Alb  3.2<L>  /  TBili  <0.2  /  DBili  x   /  AST  19  /  ALT  8   /  AlkPhos  39  02-05  < from: CT Chest w/ IV Cont (02.05.22 @ 14:30) >      IMPRESSION:    1. Bilateral centrilobular nodular opacities consistent with small   airways infection/inflammation.  2. Pulmonary nodules measuring up to 9 mm. PET/CT may be obtained after   acute symptoms resolve.  3. Intrahepatic and extrahepatic biliary ductal dilatation with mild   dilatation of the pancreatic duct. MRI with and without contrast   recommended for further evaluation.  4. Gastric wall thickening likely due to underdistention.  5. Nonspecific sclerotic lesion in the left seventh rib. This may also be   evaluated on follow-up PET/CT.    < end of copied text >    < from: CT Abdomen and Pelvis w/ Oral Cont and w/ IV Cont (02.03.22 @ 22:06) >    IMPRESSION:      Cholelithiasis and moderate intrahepatic and CBD dilatation. Further   evaluation with MRI/MRCP may be of benefit.    Focal diminished attenuation of the left lower renal cortex. This may   reflect evidence of pyelonephritis in the appropriate clinical setting.   This may also reflect sequela of prior insult.    Mild right hydronephrosis and proximal hydroureter. No definite   obstructing calculus is seen with mid and distal right ureter appearing   normal in caliber    Enlarged heterogeneous prostate    9 mm nodule in the right lower lobe. Nonemergent PET CT may be of benefit    Tree-in-bud nodularity in the right middle and lower lobes may reflect   sequela of an infectious process.      < end of copied text >

## 2022-02-05 NOTE — PROVIDER CONTACT NOTE (OTHER) - SITUATION
Pt temperature was 95.4 after blood transfusion called MD. Md stated to recheck the temperature in a few hours

## 2022-02-05 NOTE — H&P ADULT - HISTORY OF PRESENT ILLNESS
This is an 86 year old F with a PMx of an unclear abdominal sx history (has had abdominal surgery in [facility] many years ago and 1 in [place] about 15 years ago as well, but unknown which hospital), SBO managed medically at [facility] 1/2022, HTN, Questionable history of parkinson's disease (patient takes sinemet at home), BPH, B12 deficiency, GERD, presents for a two week history of generalized weakness and dizziness.  The patient has endorsed having trouble ambulating due to his fatigue.  He also endorses dizziness, which is exacerbated by standing up (granddaughter endorses history of hypotension).  Patient denies any bloody bowel movements or any episodes of hematemesis during his presentation.  Patient has endorsed abdominal pain that is cramping and feels as if his insides are "twisting" and it occurs intermittently and is moderate in severity and endorses an episode of NBNB prior to admission.  Patient endorses losing 1-2lbs in the 12 months and endorses a good appetite.  His granddaughter who is a neurology resident at [place] ([phone]) endorses that the patient has a history of orthostatic hypotension.  He has been prescribed sinemet due to incoordination in his gait, describing his gait similar to penguin.  Granddaughter denies history of hematologic issues in the family.  She is also concerned of polypharmacy as patient is on an anti-psychotic, two benzo medications along with sinemet. Patient denies chest pain, sob, fever, rigors, exertional dyspnea or dyspnea at rest.     ICU Vital Signs Last 24 Hrs  T(C): 36.9 (05 Feb 2022 18:15), Max: 37.3 (05 Feb 2022 12:00)  T(F): 98.4 (05 Feb 2022 18:15), Max: 99.1 (05 Feb 2022 12:00)  HR: 72 (05 Feb 2022 18:15) (72 - 91)  BP: 117/58 (05 Feb 2022 18:15) (70/40 - 124/54)  BP(mean): --  ABP: --  ABP(mean): --  RR: 16 (05 Feb 2022 18:15) (16 - 18)  SpO2: 100% (05 Feb 2022 18:15) (100% - 100%)    In the ED:  CBC demonstrated a Hgb downtrend from 8.4 to 5.4, CMP demonstrates  no electrolyte abnormalities and no evidence of ALI or OLLIE, Iron studies significant for RICHARD but ferritin is pending, , BUN 38/SCr 1.1.  CT Chest w/IV Contrast demonstrated  Bilateral centrilobular nodular opacities consistent with small   airways infection/inflammation nPulmonary nodules measuring up to 9 mm.Intrahepatic and extrahepatic biliary ductal dilatation with mild  dilatation of the pancreatic duct. Gastric wall thickening likely due to underdistention, CT Abdomen/Pelvis 2/3/22 demonstrated Cholelithiasis and moderate intrahepatic and CBD dilatation. Further   evaluation with MRI/MRCP may be of benefit. Focal diminished attenuation of the left lower renal cortex. This may   reflect evidence of pyelonephritis in the appropriate clinical setting.  This may also reflect sequela of prior insult. Mild right hydronephrosis and proximal hydroureter. No definite  obstructing calculus is seen with mid and distal right ureter appearing normal in caliber.  Patient is s/p 2U PRBC          PATIENT PICKS UP PRESCRIPTIONS AT [facility], IT IS CLOSED. PLEASE DO MED REC IN THE AM    This is an 86 year old F with a PMx of an unclear abdominal sx history (has had abdominal surgery in [facility] many years ago and 1 in [place] about 15 years ago as well, but unknown which hospital), [facility] 1/[facility], HTN, Questionable history of parkinson's disease (patient takes sinemet at home), BPH, B12 deficiency, GERD, presents for a two week history of generalized weakness and dizziness.  The patient has endorsed having trouble ambulating due to his fatigue.  He also endorses dizziness, which is exacerbated by standing up (granddaughter endorses history of hypotension).  Patient denies any bloody bowel movements or any episodes of hematemesis during his presentation.  Patient has endorsed abdominal pain that is cramping and feels as if his insides are "twisting" and it occurs intermittently and is moderate in severity and endorses an episode of NBNB prior to admission.  Patient endorses losing 1-2lbs in the 12 months and endorses a good appetite.  His granddaughter who is a neurology resident at [place] ([phone]) endorses that the patient has a history of orthostatic hypotension.  He has been prescribed sinemet due to incoordination in his gait, describing his gait similar to penguin.  Granddaughter denies history of hematologic issues in the family.  She is also concerned of polypharmacy as patient is on an anti-psychotic, two benzo medications along with sinemet. Patient denies chest pain, sob, fever, rigors, exertional dyspnea or dyspnea at rest.     ICU Vital Signs Last 24 Hrs  T(C): 36.9 (05 Feb 2022 18:15), Max: 37.3 (05 Feb 2022 12:00)  T(F): 98.4 (05 Feb 2022 18:15), Max: 99.1 (05 Feb 2022 12:00)  HR: 72 (05 Feb 2022 18:15) (72 - 91)  BP: 117/58 (05 Feb 2022 18:15) (70/40 - 124/54)  BP(mean): --  ABP: --  ABP(mean): --  RR: 16 (05 Feb 2022 18:15) (16 - 18)  SpO2: 100% (05 Feb 2022 18:15) (100% - 100%)    In the ED:  CBC demonstrated a Hgb downtrend from 8.4 to 5.4, CMP demonstrates  no electrolyte abnormalities and no evidence of ALI or OLLIE, Iron studies significant for RICHARD but ferritin is pending, , BUN 38/SCr 1.1.  CT Chest w/IV Contrast demonstrated  Bilateral centrilobular nodular opacities consistent with small   airways infection/inflammation nPulmonary nodules measuring up to 9 mm.Intrahepatic and extrahepatic biliary ductal dilatation with mild  dilatation of the pancreatic duct. Gastric wall thickening likely due to underdistention, CT Abdomen/Pelvis 2/3/22 demonstrated Cholelithiasis and moderate intrahepatic and CBD dilatation. Further   evaluation with MRI/MRCP may be of benefit. Focal diminished attenuation of the left lower renal cortex. This may   reflect evidence of pyelonephritis in the appropriate clinical setting.  This may also reflect sequela of prior insult. Mild right hydronephrosis and proximal hydroureter. No definite  obstructing calculus is seen with mid and distal right ureter appearing normal in caliber.  Patient is s/p 2U PRBC          PATIENT PICKS UP PRESCRIPTIONS AT [email or web address], IT IS CLOSED. PLEASE DO MED REC IN THE AM    This is an 86 year old M with a PMx of an unclear abdominal sx history (has had abdominal surgery in [facility] many years ago and 1 in [place] about 15 years ago as well, but unknown which hospital), SBO managed medically at [facility] 1/2022, HTN, Questionable history of parkinson's disease (patient takes sinemet at home), BPH, B12 deficiency, GERD, presents for a two week history of generalized weakness and dizziness.  The patient has endorsed having trouble ambulating due to his fatigue.  He also endorses dizziness, which is exacerbated by standing up (granddaughter endorses history of hypotension).  Patient denies any bloody bowel movements or any episodes of hematemesis during his presentation.  Patient has endorsed abdominal pain that is cramping and feels as if his insides are "twisting" and it occurs intermittently and is moderate in severity and endorses an episode of NBNB prior to admission.  Patient endorses losing 1-2lbs in the 12 months and endorses a good appetite.  His granddaughter who is a neurology resident at [place] ([phone]) endorses that the patient has a history of orthostatic hypotension.  He has been prescribed sinemet due to incoordination in his gait, describing his gait similar to penguin.  Granddaughter denies history of hematologic issues in the family.  She is also concerned of polypharmacy as patient is on an anti-psychotic, two benzo medications along with sinemet. Patient denies chest pain, sob, fever, rigors, exertional dyspnea or dyspnea at rest.     ICU Vital Signs Last 24 Hrs  T(C): 36.9 (05 Feb 2022 18:15), Max: 37.3 (05 Feb 2022 12:00)  T(F): 98.4 (05 Feb 2022 18:15), Max: 99.1 (05 Feb 2022 12:00)  HR: 72 (05 Feb 2022 18:15) (72 - 91)  BP: 117/58 (05 Feb 2022 18:15) (70/40 - 124/54)  BP(mean): --  ABP: --  ABP(mean): --  RR: 16 (05 Feb 2022 18:15) (16 - 18)  SpO2: 100% (05 Feb 2022 18:15) (100% - 100%)    In the ED:  CBC demonstrated a Hgb downtrend from 8.4 to 5.4, CMP demonstrates  no electrolyte abnormalities and no evidence of ALI or OLLIE, Iron studies significant for RICHARD but ferritin is pending, , BUN 38/SCr 1.1.  CT Chest w/IV Contrast demonstrated  Bilateral centrilobular nodular opacities consistent with small   airways infection/inflammation nPulmonary nodules measuring up to 9 mm.Intrahepatic and extrahepatic biliary ductal dilatation with mild  dilatation of the pancreatic duct. Gastric wall thickening likely due to underdistention, CT Abdomen/Pelvis 2/3/22 demonstrated Cholelithiasis and moderate intrahepatic and CBD dilatation. Further   evaluation with MRI/MRCP may be of benefit. Focal diminished attenuation of the left lower renal cortex. This may   reflect evidence of pyelonephritis in the appropriate clinical setting.  This may also reflect sequela of prior insult. Mild right hydronephrosis and proximal hydroureter. No definite  obstructing calculus is seen with mid and distal right ureter appearing normal in caliber.  Patient is s/p 2U PRBC

## 2022-02-05 NOTE — H&P ADULT - NSHPOUTPATIENTPROVIDERS_GEN_ALL_CORE
[facility] [phone]    Granddaughter- [phone]  Daughter- [phone]  Grandson- [phone]    MARIPOSA Torres [phone]

## 2022-02-05 NOTE — H&P ADULT - ASSESSMENT
86 year old F with a PMx of an unclear abdominal sx history (has had abdominal surgery in [place] many years ago and 1 in [place] about 15 years ago as well, but unknown which hospital), SBO managed medically at [facility] 1/2022, HTN, Questionable history of parkinson's disease (patient takes sinemet at home), BPH, B12 deficiency, GERD, presents for a two week history of generalized weakness and dizziness.    Acute on Chronic Microcytic Anemia  GIB not suspected as patient had a negative SHMUEL   CBC demonstrated a Hgb downtrend from 8.4 to 5.4, CMP demonstrates  no electrolyte abnormalities and no evidence of ALI or OLLIE, Iron studies significant for RICHARD but ferritin is pending, , BUN 38/SCr 1.1.    Iron 107, TIBC 175, Transferrin 121, Ferritin pending  CT Chest w/IV Contrast demonstrated  Bilateral centrilobular nodular opacities consistent with small   airways infection/inflammation, Pulmonary nodules measuring up to 9 mm.Intrahepatic and extrahepatic biliary ductal dilatation with mild  dilatation of the pancreatic duct. Gastric wall thickening likely due to underdistention, CT Abdomen/Pelvis 2/3/22 demonstrated Cholelithiasis and moderate intrahepatic and CBD dilatation Mild right hydronephrosis and proximal hydroureter  Will repeat CT Abdomen/Pelvis non-con to r/o any intraperitoneal bleed   f/u w/Hb electrophoresis to r/i possible thalassemia   Keep Active T+S  GI consulted    #Questionable history of parkinson/jose body dementia  Patient is on sinemet  TID as granddaughter has noted that patient has had visual hallucinations over the past several years and is increasingly agitated at night  Patient is also on klonopin and xanax as well but frequency and doses are unknown   Patient also takes quetiapine 25mg PO qdaily   Patient has had imaging of his brain in the past but granddaughter is unsure where.   Will c/w xanax .5mg PO qdaily and quetiapine 25mg PO qdaily   neurology consulted    #HTN  would hold off on giving atenolol 25mg PO qdaily in setting of acute anemia and orthostatic hypotension    #B12 def  c/w b12 1000mcg PO qdaily    #GERD  c/w PPI               86 year old F with a PMx of an unclear abdominal sx history (has had abdominal surgery in [facility] many years ago and 1 in [place] about 15 years ago as well, but unknown which hospital), SBO managed medically at [facility] 1/2022, HTN, Questionable history of parkinson's disease (patient takes sinemet at home), BPH, B12 deficiency, GERD, presents for a two week history of generalized weakness and dizziness.    Acute on Chronic Microcytic Anemia  GIB not suspected as patient had a negative SHMUEL   CBC demonstrated a Hgb downtrend from 8.4 to 5.4, CMP demonstrates  no electrolyte abnormalities and no evidence of ALI or OLLIE, Iron studies significant for RICHARD but ferritin is pending, , BUN 38/SCr 1.1.    Iron 107, TIBC 175, Transferrin 121, Ferritin pending  CT Chest w/IV Contrast demonstrated  Bilateral centrilobular nodular opacities consistent with small   airways infection/inflammation, Pulmonary nodules measuring up to 9 mm.Intrahepatic and extrahepatic biliary ductal dilatation with mild  dilatation of the pancreatic duct. Gastric wall thickening likely due to underdistention, CT Abdomen/Pelvis 2/3/22 demonstrated Cholelithiasis and moderate intrahepatic and CBD dilatation Mild right hydronephrosis and proximal hydroureter  Will repeat CT Abdomen/Pelvis non-con to r/o any intraperitoneal bleed   f/u w/Hb electrophoresis to r/i possible thalassemia   Keep Active T+S  starting Pantoprazole 40mg IV qdaily  GI consulted    #Questionable history of parkinson/jose body dementia  Patient is on sinemet  TID as granddaughter has noted that patient has had visual hallucinations over the past several years and is increasingly agitated at night  Patient is also on klonopin and xanax as well but frequency and doses are unknown   Patient also takes quetiapine 25mg PO qdaily   Patient has had imaging of his brain in the past but granddaughter is unsure where.   Will c/w xanax .5mg PO qdaily and quetiapine 25mg PO qdaily   neurology consulted    #HTN  would hold off on giving atenolol 25mg PO qdaily in setting of acute anemia and orthostatic hypotension    #B12 def  c/w b12 1000mcg PO qdaily    #GERD  c/w PPI      #DVT ppx: SCD  #GI: PPI  #AAT  Diet: DASH  Dispo: Acute  Full Code           86 year old M with a PMx of an unclear abdominal sx history (has had abdominal surgery in [facility] many years ago and 1 in [place] about 15 years ago as well, but unknown which hospital), SBO managed medically at [facility] 1/2022, HTN, Questionable history of parkinson's disease (patient takes sinemet at home), BPH, B12 deficiency, GERD, presents for a two week history of generalized weakness and dizziness.    Acute on Chronic Microcytic Anemia  GIB not suspected as patient had a negative SHMUEL   CBC demonstrated a Hgb downtrend from 8.4 to 5.4, CMP demonstrates  no electrolyte abnormalities and no evidence of ALI or OLLIE, Iron studies significant for RICHARD but ferritin is pending, , BUN 38/SCr 1.1.    Iron 107, TIBC 175, Transferrin 121, Ferritin pending  CT Chest w/IV Contrast demonstrated  Bilateral centrilobular nodular opacities consistent with small   airways infection/inflammation, Pulmonary nodules measuring up to 9 mm.Intrahepatic and extrahepatic biliary ductal dilatation with mild  dilatation of the pancreatic duct. Gastric wall thickening likely due to underdistention, CT Abdomen/Pelvis 2/3/22 demonstrated Cholelithiasis and moderate intrahepatic and CBD dilatation Mild right hydronephrosis and proximal hydroureter  Will repeat CT Abdomen/Pelvis non-con to r/o any intraperitoneal bleed   f/u w/Hb electrophoresis to r/i possible thalassemia   Keep Active T+S  starting Pantoprazole 40mg IV qdaily  GI consulted    #Questionable history of parkinson/jose body dementia  Patient is on sinemet  TID as granddaughter has noted that patient has had visual hallucinations over the past several years and is increasingly agitated at night  Patient is also on klonopin and xanax as well but frequency and doses are unknown   Patient also takes quetiapine 25mg PO qdaily   Patient has had imaging of his brain in the past but granddaughter is unsure where.   Will c/w xanax .5mg PO qdaily and quetiapine 25mg PO qdaily   neurology consulted    #HTN  would hold off on giving atenolol 25mg PO qdaily in setting of acute anemia and orthostatic hypotension    #B12 def  c/w b12 1000mcg PO qdaily    #GERD  c/w PPI      #DVT ppx: SCD  #GI: PPI  #AAT  Diet: DASH  Dispo: Acute  Full Code

## 2022-02-05 NOTE — ED PROVIDER NOTE - CLINICAL SUMMARY MEDICAL DECISION MAKING FREE TEXT BOX
anemia - no signs of GI bleed or internal bleeding - fluid responsive - transfusing and admitting for malignancy workup

## 2022-02-05 NOTE — CONSULT NOTE ADULT - ASSESSMENT
86 year old M with a PMx of an unclear abdominal sx history (has had abdominal surgery in [facility] many years ago and 1 in [place] about 15 years ago as well, but unknown which hospital), SBO managed medically at [facility] 1/2022, HTN, Questionable history of parkinson's disease (patient takes sinemet at home), BPH, B12 deficiency, GERD, presents for a two week history of generalized weakness and dizziness.  On the work up it was found out patient is anemic with Hgb at 5.4.   Neurology is consulted to evluate the patient for parkinson's disease.   On the exam patient has mild parkinsonian features as note above in physical exam,  86 year old M with a PMx of an unclear abdominal sx history (has had abdominal surgery in [facility] many years ago and 1 in [place] about 15 years ago as well, but unknown which hospital), SBO managed medically at [facility] 1/2022, HTN, Questionable history of parkinson's disease (patient takes sinemet at home), BPH, B12 deficiency, GERD, presents for a two week history of generalized weakness and dizziness.  On the work up it was found out patient is anemic with Hgb at 5.4.   Neurology is consulted to evluate the patient for parkinson's disease.   On the exam patient has mild parkinsonian features as note above in physical exam, however the symptoms are not significant to interrupt with the medical management at this moment.     Recommendations:   Please Continue home dose Sinemet  Follow up as outpatient with neurology clinic    Can continue Quetiapine for now and monitor, as second generation antipsychotics has lower risk of worsening his parkinosnian symptoms. Avoid Dopamine blocker agents.   Management per primary team.    Attending note will follow.  86 year old M with a PMx of an unclear abdominal sx history (has had abdominal surgery in [facility] many years ago and 1 in [place] about 15 years ago as well, but unknown which hospital), SBO managed medically at [facility] 1/2022, HTN, Questionable history of parkinson's disease (patient takes sinemet at home), BPH, B12 deficiency, GERD, presents for a two week history of generalized weakness and dizziness.  On the work up it was found out patient is anemic with Hgb at 5.4.   Neurology is consulted to evluate the patient for parkinson's disease.   On the exam patient has mild parkinsonian features as note above in physical exam, however the symptoms are not significant to interrupt with the medical management at this moment.     Recommendations:   Please Continue home dose Sinemet  Follow up as outpatient with outside neurologist in [place] for further dose adjustment  Can continue Quetiapine for now and monitor, as second generation antipsychotics has lower risk of worsening his parkinosnian symptoms. Avoid Dopamine blocker agents.   D/c Xanax since higher morbidity in elderly pts  Management per primary team.  call back as needed

## 2022-02-05 NOTE — H&P ADULT - NSHPREVIEWOFSYSTEMS_GEN_ALL_CORE
REVIEW OF SYSTEMS:    CONSTITUTIONAL: No weakness, fevers or chills  EYES/ENT: No visual changes;  No vertigo or throat pain   NECK: No pain or stiffness  RESPIRATORY: No cough, wheezing, hemoptysis; No shortness of breath  CARDIOVASCULAR: No chest pain or palpitations  GASTROINTESTINAL: +abdominal pain, +nausea, +Vomiting, no hematemesis; No diarrhea or constipation. No melena or hematochezia.  GENITOURINARY: No dysuria, frequency or hematuria  NEUROLOGICAL: No numbness or weakness  SKIN: No itching, rashes

## 2022-02-05 NOTE — ED PROVIDER NOTE - PROGRESS NOTE DETAILS
Frandy: I speak Mandarin fluently. Pt and daughter offered  services, they both declined. Frandy: I attempted to reach pt's PMD Dr. Torrey Torres at [phone] - no answer, left voicemail with callback number. Frandy: Hb 5.4 today (from 8.4 when pt seen in ED 2 days ago) - written consent for transfusion done with daughter as witnessed, will scan in chart. 2U pRBC ordered. Frandy: pt returned from CT, vitals stable, /56. Will admit to medicine floor for anemia workup.

## 2022-02-05 NOTE — ED ADULT NURSE NOTE - NSIMPLEMENTINTERV_GEN_ALL_ED
Implemented All Fall with Harm Risk Interventions:  [place] to call system. Call bell, personal items and telephone within reach. Instruct patient to call for assistance. Room bathroom lighting operational. Non-slip footwear when patient is off stretcher. Physically safe environment: no spills, clutter or unnecessary equipment. Stretcher in lowest position, wheels locked, appropriate side rails in place. Provide visual cue, wrist band, yellow gown, etc. Monitor gait and stability. Monitor for mental status changes and reorient to person, place, and time. Review medications for side effects contributing to fall risk. Reinforce activity limits and safety measures with patient and family. Provide visual clues: red socks.

## 2022-02-05 NOTE — H&P ADULT - ATTENDING COMMENTS
86/M with unclear abdominal sx history (has had abdominal surgery in [facility] many years ago and 1 in [place] about 15 years ago as well, but unknown which hospital), SBO managed medically at [facility] 1/2022, HTN, Questionable history of parkinson's disease (patient takes sinemet at home), BPH, B12 deficiency, GERD, presents for a two week history of generalized weakness and dizziness.    Labs and scans: personally reviewed.     Physical exam:   General: not in distress, cachetic   HEENT: ATNC  LUNGS: CTAB, no wheezing, rales, rhonchi  HEART: RRR, +S1,S2  ABDOMEN: NDNT x 4 q's  EXT: Warm, well perfused    NEURO: AxOx3 , non focal     Plan:     # Acute on Chronic Microcytic Anemia  - no clear source of bleeding noted   -  likely anemia of chronic dis from iron panel   - s/p 2 units of prbc in ed --> follow post transfusion cbc   - CT Chest w/IV Contrast demonstrated  Bilateral centrilobular nodular opacities consistent with small  airways infection/inflammation, Pulmonary nodules measuring up to 9 mm. Intrahepatic and extrahepatic biliary ductal dilatation with mild  dilatation of the pancreatic duct. Gastric wall thickening likely due to underdistention  - CT Abdomen/Pelvis 2/3/22 demonstrated Cholelithiasis and moderate intrahepatic and CBD dilatation Mild right hydronephrosis and proximal hydroureter  - GI consult   - protonix     # ? parkinson/jose body dementia  - ? on sinemet, klonopin, xanax and quetiapine   - confirm home dose and continue     # HTN  - atenolol on hold due to borderline BP     # B12 def  c/w b12 1000mcg PO qdaily    # GERD  - c/w PPI    #DVT ppx: SCD, switch to chemical proph if Hb remains stable after transfusion

## 2022-02-06 LAB
ALBUMIN SERPL ELPH-MCNC: 3.2 G/DL — LOW (ref 3.5–5.2)
ALP SERPL-CCNC: 41 U/L — SIGNIFICANT CHANGE UP (ref 30–115)
ALT FLD-CCNC: <5 U/L — SIGNIFICANT CHANGE UP (ref 0–41)
ANION GAP SERPL CALC-SCNC: 10 MMOL/L — SIGNIFICANT CHANGE UP (ref 7–14)
AST SERPL-CCNC: 18 U/L — SIGNIFICANT CHANGE UP (ref 0–41)
BASOPHILS # BLD AUTO: 0.08 K/UL — SIGNIFICANT CHANGE UP (ref 0–0.2)
BASOPHILS # BLD AUTO: 0.09 K/UL — SIGNIFICANT CHANGE UP (ref 0–0.2)
BASOPHILS NFR BLD AUTO: 1.1 % — HIGH (ref 0–1)
BASOPHILS NFR BLD AUTO: 1.6 % — HIGH (ref 0–1)
BILIRUB SERPL-MCNC: 0.2 MG/DL — SIGNIFICANT CHANGE UP (ref 0.2–1.2)
BUN SERPL-MCNC: 22 MG/DL — HIGH (ref 10–20)
CALCIUM SERPL-MCNC: 8 MG/DL — LOW (ref 8.5–10.1)
CHLORIDE SERPL-SCNC: 103 MMOL/L — SIGNIFICANT CHANGE UP (ref 98–110)
CO2 SERPL-SCNC: 23 MMOL/L — SIGNIFICANT CHANGE UP (ref 17–32)
CREAT SERPL-MCNC: 1 MG/DL — SIGNIFICANT CHANGE UP (ref 0.7–1.5)
EOSINOPHIL # BLD AUTO: 0.38 K/UL — SIGNIFICANT CHANGE UP (ref 0–0.7)
EOSINOPHIL # BLD AUTO: 0.44 K/UL — SIGNIFICANT CHANGE UP (ref 0–0.7)
EOSINOPHIL NFR BLD AUTO: 5.1 % — SIGNIFICANT CHANGE UP (ref 0–8)
EOSINOPHIL NFR BLD AUTO: 8 % — SIGNIFICANT CHANGE UP (ref 0–8)
FERRITIN SERPL-MCNC: 249 NG/ML — SIGNIFICANT CHANGE UP (ref 30–400)
GLUCOSE SERPL-MCNC: 142 MG/DL — HIGH (ref 70–99)
HCT VFR BLD CALC: 26.1 % — LOW (ref 42–52)
HCT VFR BLD CALC: 27.2 % — LOW (ref 42–52)
HGB BLD-MCNC: 8.5 G/DL — LOW (ref 14–18)
HGB BLD-MCNC: 9 G/DL — LOW (ref 14–18)
IMM GRANULOCYTES NFR BLD AUTO: 4.9 % — HIGH (ref 0.1–0.3)
IMM GRANULOCYTES NFR BLD AUTO: 5.8 % — HIGH (ref 0.1–0.3)
LYMPHOCYTES # BLD AUTO: 0.87 K/UL — LOW (ref 1.2–3.4)
LYMPHOCYTES # BLD AUTO: 1.32 K/UL — SIGNIFICANT CHANGE UP (ref 1.2–3.4)
LYMPHOCYTES # BLD AUTO: 15.7 % — LOW (ref 20.5–51.1)
LYMPHOCYTES # BLD AUTO: 17.9 % — LOW (ref 20.5–51.1)
MAGNESIUM SERPL-MCNC: 2.1 MG/DL — SIGNIFICANT CHANGE UP (ref 1.8–2.4)
MCHC RBC-ENTMCNC: 23.9 PG — LOW (ref 27–31)
MCHC RBC-ENTMCNC: 24.3 PG — LOW (ref 27–31)
MCHC RBC-ENTMCNC: 32.6 G/DL — SIGNIFICANT CHANGE UP (ref 32–37)
MCHC RBC-ENTMCNC: 33.1 G/DL — SIGNIFICANT CHANGE UP (ref 32–37)
MCV RBC AUTO: 73.3 FL — LOW (ref 80–94)
MCV RBC AUTO: 73.5 FL — LOW (ref 80–94)
MONOCYTES # BLD AUTO: 0.64 K/UL — HIGH (ref 0.1–0.6)
MONOCYTES # BLD AUTO: 0.7 K/UL — HIGH (ref 0.1–0.6)
MONOCYTES NFR BLD AUTO: 11.6 % — HIGH (ref 1.7–9.3)
MONOCYTES NFR BLD AUTO: 9.5 % — HIGH (ref 1.7–9.3)
NEUTROPHILS # BLD AUTO: 3.17 K/UL — SIGNIFICANT CHANGE UP (ref 1.4–6.5)
NEUTROPHILS # BLD AUTO: 4.54 K/UL — SIGNIFICANT CHANGE UP (ref 1.4–6.5)
NEUTROPHILS NFR BLD AUTO: 57.3 % — SIGNIFICANT CHANGE UP (ref 42.2–75.2)
NEUTROPHILS NFR BLD AUTO: 61.5 % — SIGNIFICANT CHANGE UP (ref 42.2–75.2)
NRBC # BLD: 0 /100 WBCS — SIGNIFICANT CHANGE UP (ref 0–0)
NRBC # BLD: 0 /100 WBCS — SIGNIFICANT CHANGE UP (ref 0–0)
PHOSPHATE SERPL-MCNC: 3 MG/DL — SIGNIFICANT CHANGE UP (ref 2.1–4.9)
PLATELET # BLD AUTO: 154 K/UL — SIGNIFICANT CHANGE UP (ref 130–400)
PLATELET # BLD AUTO: 171 K/UL — SIGNIFICANT CHANGE UP (ref 130–400)
POTASSIUM SERPL-MCNC: 4.4 MMOL/L — SIGNIFICANT CHANGE UP (ref 3.5–5)
POTASSIUM SERPL-SCNC: 4.4 MMOL/L — SIGNIFICANT CHANGE UP (ref 3.5–5)
PROT SERPL-MCNC: 5.2 G/DL — LOW (ref 6–8)
RBC # BLD: 3.55 M/UL — LOW (ref 4.7–6.1)
RBC # BLD: 3.71 M/UL — LOW (ref 4.7–6.1)
RBC # FLD: 21.2 % — HIGH (ref 11.5–14.5)
RBC # FLD: 21.2 % — HIGH (ref 11.5–14.5)
SODIUM SERPL-SCNC: 136 MMOL/L — SIGNIFICANT CHANGE UP (ref 135–146)
WBC # BLD: 5.53 K/UL — SIGNIFICANT CHANGE UP (ref 4.8–10.8)
WBC # BLD: 7.38 K/UL — SIGNIFICANT CHANGE UP (ref 4.8–10.8)
WBC # FLD AUTO: 5.53 K/UL — SIGNIFICANT CHANGE UP (ref 4.8–10.8)
WBC # FLD AUTO: 7.38 K/UL — SIGNIFICANT CHANGE UP (ref 4.8–10.8)

## 2022-02-06 PROCEDURE — 99222 1ST HOSP IP/OBS MODERATE 55: CPT

## 2022-02-06 PROCEDURE — 99232 SBSQ HOSP IP/OBS MODERATE 35: CPT

## 2022-02-06 PROCEDURE — 99221 1ST HOSP IP/OBS SF/LOW 40: CPT

## 2022-02-06 PROCEDURE — 99223 1ST HOSP IP/OBS HIGH 75: CPT

## 2022-02-06 RX ADMIN — PANTOPRAZOLE SODIUM 40 MILLIGRAM(S): 20 TABLET, DELAYED RELEASE ORAL at 05:52

## 2022-02-06 RX ADMIN — ATENOLOL 25 MILLIGRAM(S): 25 TABLET ORAL at 05:53

## 2022-02-06 RX ADMIN — Medication 0.5 MILLIGRAM(S): at 15:19

## 2022-02-06 RX ADMIN — FINASTERIDE 5 MILLIGRAM(S): 5 TABLET, FILM COATED ORAL at 15:19

## 2022-02-06 RX ADMIN — CARBIDOPA AND LEVODOPA 1 TABLET(S): 25; 100 TABLET ORAL at 15:19

## 2022-02-06 RX ADMIN — CARBIDOPA AND LEVODOPA 1 TABLET(S): 25; 100 TABLET ORAL at 05:52

## 2022-02-06 RX ADMIN — QUETIAPINE FUMARATE 25 MILLIGRAM(S): 200 TABLET, FILM COATED ORAL at 15:19

## 2022-02-06 NOTE — PROGRESS NOTE ADULT - ASSESSMENT
86 year old M with a PMx of an unclear abdominal sx history (has had abdominal surgery in [facility] many years ago and 1 in [place] about 15 years ago as well, but unknown which hospital), SBO managed medically at [facility] 1/2022, HTN, Questionable history of parkinson's disease (patient takes sinemet at home), BPH, B12 deficiency, GERD, presents for a two week history of generalized weakness and dizziness.    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   Will c/w xanax .5mg PO qdaily and quetiapine 25mg PO qdaily - klonopin stopped   neurology consult appreciated     #Hydronephrosis  -urology consulted  -bladder scan ordered  -continue home tamsulosin  - REnal US     #HTN  would hold off on giving atenolol 25mg PO qdaily in setting of acute anemia and orthostatic hypotension    #B12 def  c/w b12 1000mcg PO qdaily    #GERD  c/w PPI    #DVT ppx: SCD  #GI: PPI  #AAT  Diet: DASH  Dispo: Acute  Full Code  Pending: urology consult, bladder scan, renal US, patient decision on endoscopy H/H trend

## 2022-02-06 NOTE — CONSULT NOTE ADULT - ASSESSMENT
87 yo Cantonese speaking  Male with PMH of HTN, HLD, h/o SBO , h/o abdominal surgery ( 15 years ago), BPH on Tamsulosin and Finasteride admitted to medicine with generalized weakness and dizziness Dx with anemia s/p 2 units of PRBC in ED Urology called to evaluate Pt. for CT A/P w/wo IVC (2/3/22)  findings of distended urinary bladder and right hydroperinephrosis.    Repeat CT A/P ( 2/5/22) findings shows retained contrast within bilateral collecting systems. No hydronephrosis bilaterally. Retained contrast within urinary bladder. Enlarged prostate.  Currently Pt. is able to urinate, no acute complains. On exam palpable urinary bladder.       Plan:  No acute  intervention needed at this time   RBUS to evaluate PVR , if PVR > 350cc consider CIC if patient agreeable   Cont. Flomax   Cont. Finasteride   F/U with Urology for further  management as an OP   Cont. current medical management as per primary medical team   Will d/w  attending.

## 2022-02-06 NOTE — CONSULT NOTE ADULT - ASSESSMENT
86 year old M with a PMx of an unclear abdominal sx history (has had abdominal surgery in [facility] many years ago and 1 in [place] about 15 years ago as well, but unknown which hospital), SBO managed medically at [facility] 1/2022, HTN, Questionable history of parkinson's disease (patient takes sinemet at home), BPH, B12 deficiency, GERD, presents for a two week history of generalized weakness and dizziness.    #Acute on chronic anemia - no overt evidence of bleeding  - H&H baseline - 8-9  - on admission 5.4 -> 1 unit -> 8.5. MCV 73  - Iron studies not consistent /w iron deficiency anemi    Rec  - Pt will benefit from egd and colonoscopy. pt appears to be apprehensive about procedure and prefers to talk to family and have procedure performed as outpatient as opposed to inpatient. if pt is ammenable to endoscopic intervention as inpatient please notify GI  - Target Hb >8  - monitor cbc qdaily   86 year old M with a PMx of an unclear abdominal sx history (has had abdominal surgery in [facility] many years ago and 1 in [place] about 15 years ago as well, but unknown which hospital), SBO managed medically at [facility] 1/2022, HTN, Questionable history of parkinson's disease (patient takes sinemet at home), BPH, B12 deficiency, GERD, presents for a two week history of generalized weakness and dizziness.    #Acute on chronic anemia - no overt evidence of bleeding  - H&H baseline - 8-9  - on admission 5.4 -> 1 unit -> 8.5. MCV 73  - Iron studies not consistent /w iron deficiency anemi    Rec  - Pt will benefit from egd and colonoscopy. pt appears to be apprehensive about procedure and prefers to talk to family and have procedure performed as outpatient as opposed to inpatient. if pt is ammenable to endoscopic intervention as inpatient please notify GI  - Target Hb >8  - monitor cbc qdaily    #Dilated CBD and PD  - recommend outpatinet MRI pancreatic protocol with MRCP 86 year old M with a PMx of an unclear abdominal sx history (has had abdominal surgery in [facility] many years ago and 1 in [place] about 15 years ago as well, but unknown which hospital), SBO managed medically at [facility] 1/2022, HTN, Questionable history of parkinson's disease (patient takes sinemet at home), BPH, B12 deficiency, GERD, presents for a two week history of generalized weakness and dizziness.    #Acute on chronic anemia - no overt evidence of bleeding  - H&H baseline - 8-9  - on admission 5.4 -> 1 unit -> 8.5. MCV 73  - Iron studies not consistent /w iron deficiency anemi    Rec  - Pt will benefit from egd and colonoscopy. pt appears to be apprehensive about procedure and prefers to talk to family and have procedure performed as outpatient as opposed to inpatient. if pt is ammenable to endoscopic intervention as inpatient please notify GI  - Target Hb >8  - monitor cbc qdaily    #Dilated CBD and PD  - recommend MRI pancreatic protocol with MRCP

## 2022-02-06 NOTE — CONSULT NOTE ADULT - SUBJECTIVE AND OBJECTIVE BOX
Gastroenterology Consultation:    Patient is a 86y old  Male who presents with a chief complaint of Acute on Chronic Anemia, Abdominal Pain (05 Feb 2022 20:20)        Admitted on: 02-05-22      HPI:  PATIENT PICKS UP PRESCRIPTIONS AT LOAG, IT IS CLOSED. PLEASE DO MED REC IN THE AM    This is an 86 year old M with a PMx of an unclear abdominal sx history (has had abdominal surgery in [facility] many years ago and 1 in [place] about 15 years ago as well, but unknown which hospital), SBO managed medically at [facility] 1/2022, HTN, Questionable history of parkinson's disease (patient takes sinemet at home), BPH, B12 deficiency, GERD, presents for a two week history of generalized weakness and dizziness.  The patient has endorsed having trouble ambulating due to his fatigue.  He also endorses dizziness, which is exacerbated by standing up (granddaughter endorses history of hypotension).  Patient denies any bloody bowel movements or any episodes of hematemesis during his presentation.  Patient has endorsed abdominal pain that is cramping and feels as if his insides are "twisting" and it occurs intermittently and is moderate in severity and endorses an episode of NBNB prior to admission.  Patient endorses losing 1-2lbs in the 12 months and endorses a good appetite.  His granddaughter who is a neurology resident at [place] ([phone]) endorses that the patient has a history of orthostatic hypotension.  He has been prescribed sinemet due to incoordination in his gait, describing his gait similar to penguin.  Granddaughter denies history of hematologic issues in the family.  She is also concerned of polypharmacy as patient is on an anti-psychotic, two benzo medications along with sinemet. Patient denies chest pain, sob, fever, rigors, exertional dyspnea or dyspnea at rest.     ICU Vital Signs Last 24 Hrs  T(C): 36.9 (05 Feb 2022 18:15), Max: 37.3 (05 Feb 2022 12:00)  T(F): 98.4 (05 Feb 2022 18:15), Max: 99.1 (05 Feb 2022 12:00)  HR: 72 (05 Feb 2022 18:15) (72 - 91)  BP: 117/58 (05 Feb 2022 18:15) (70/40 - 124/54)  BP(mean): --  ABP: --  ABP(mean): --  RR: 16 (05 Feb 2022 18:15) (16 - 18)  SpO2: 100% (05 Feb 2022 18:15) (100% - 100%)    In the ED:  CBC demonstrated a Hgb downtrend from 8.4 to 5.4, CMP demonstrates  no electrolyte abnormalities and no evidence of ALI or OLLIE, Iron studies significant for RICHARD but ferritin is pending, , BUN 38/SCr 1.1.  CT Chest w/IV Contrast demonstrated  Bilateral centrilobular nodular opacities consistent with small   airways infection/inflammation nPulmonary nodules measuring up to 9 mm.Intrahepatic and extrahepatic biliary ductal dilatation with mild  dilatation of the pancreatic duct. Gastric wall thickening likely due to underdistention, CT Abdomen/Pelvis 2/3/22 demonstrated Cholelithiasis and moderate intrahepatic and CBD dilatation. Further   evaluation with MRI/MRCP may be of benefit. Focal diminished attenuation of the left lower renal cortex. This may   reflect evidence of pyelonephritis in the appropriate clinical setting.  This may also reflect sequela of prior insult. Mild right hydronephrosis and proximal hydroureter. No definite  obstructing calculus is seen with mid and distal right ureter appearing normal in caliber.  Patient is s/p 2U PRBC          (05 Feb 2022 19:03)        Prior EGD: none     Prior Colonoscopy: none      PAST MEDICAL & SURGICAL HISTORY:  HTN (hypertension)    HLD (hyperlipidemia)    BPH (benign prostatic hyperplasia)    SBO (small bowel obstruction)    History of appendectomy          FAMILY HISTORY:  No pertinent family history in first degree relatives        Social History:  Tobacco: denies   Alcohol: denies   Drugs: denies     Home Medications:  ALPRAZOLAM 0.5MG TAB: 1 tab(s) orally once a day (05 Feb 2022 19:39)  ATENOLOL 25MG TAB:  (05 Feb 2022 19:36)  CARBIDOPA/LEVODOPA 10-100MG TAB:  (05 Feb 2022 19:36)  DEXILANT 60MG DR CAP:  (05 Feb 2022 19:36)  DUTASTERIDE 0.5MG CAP: 1 cap(s) orally once a day (05 Feb 2022 19:36)  QUETIAPINE FUMARATE 25MG TAB: 1 tab(s) orally once a day (05 Feb 2022 19:38)  TAMSULOSIN HYDROCHLORIDE 0.4MG CAP:  (05 Feb 2022 19:36)        MEDICATIONS  (STANDING):  ALPRAZolam 0.5 milliGRAM(s) Oral daily  ATENolol  Tablet 25 milliGRAM(s) Oral daily  carbidopa/levodopa  10/100 1 Tablet(s) Oral three times a day  finasteride 5 milliGRAM(s) Oral daily  pantoprazole    Tablet 40 milliGRAM(s) Oral before breakfast  QUEtiapine 25 milliGRAM(s) Oral daily  tamsulosin 0.4 milliGRAM(s) Oral at bedtime    MEDICATIONS  (PRN):      Allergies  No Known Allergies      Review of Systems:   Constitutional:  No Fever, No Chills  ENT/Mouth:  No Hearing Changes,  No Difficulty Swallowing  Eyes:  No Eye Pain, No Vision Changes  Cardiovascular:  No Chest Pain, No Palpitations  Respiratory:  No Cough, No Dyspnea  Gastrointestinal:  As described in HPI  Musculoskeletal:  No Joint Swelling, No Back Pain  Skin:  No Skin Lesions, No Jaundice  Neuro:  No Syncope, No Dizziness  Heme/Lymph:  No Bruising, No Bleeding.          Physical Examination:  T(C): 35.7 (02-06-22 @ 08:10), Max: 37.3 (02-05-22 @ 12:00)  HR: 80 (02-06-22 @ 08:10) (72 - 91)  BP: 121/58 (02-06-22 @ 08:10) (70/40 - 168/75)  RR: 18 (02-06-22 @ 08:10) (16 - 18)  SpO2: 99% (02-06-22 @ 00:06) (99% - 100%)    Weight (kg): 50 (02-05-22 @ 12:00)        GENERAL: AAOx3, no acute distress.  HEAD:  Atraumatic, Normocephalic  EYES: conjunctiva and sclera clear  NECK: Supple, no JVD or thyromegaly  CHEST/LUNG: Clear to auscultation bilaterally; No wheeze, rhonchi, or rales  HEART: Regular rate and rhythm; normal S1, S2, No murmurs.  ABDOMEN: Soft, nontender, nondistended; Bowel sounds present  NEUROLOGY: No asterixis or tremor.   SKIN: Intact, no jaundice        Data:                        9.0    5.53  )-----------( 171      ( 06 Feb 2022 04:30 )             27.2     Hgb Trend:  9.0  02-06-22 @ 04:30  8.5  02-05-22 @ 23:30  5.4  02-05-22 @ 12:03  8.4  02-03-22 @ 16:36      02-05    138  |  108  |  38<H>  ----------------------------<  115<H>  4.4   |  21  |  1.1    Ca    8.1<L>      05 Feb 2022 12:03    TPro  5.1<L>  /  Alb  3.2<L>  /  TBili  <0.2  /  DBili  x   /  AST  19  /  ALT  8   /  AlkPhos  39  02-05    Liver panel trend:  TBili <0.2   /   AST 19   /   ALT 8   /   AlkP 39   /   Tptn 5.1   /   Alb 3.2    /   DBili --      02-05  TBili 0.2   /   AST 16   /   ALT 8   /   AlkP 41   /   Tptn 5.7   /   Alb 3.3    /   DBili --      02-03      PT/INR - ( 05 Feb 2022 12:03 )   PT: 11.30 sec;   INR: 0.98 ratio         PTT - ( 05 Feb 2022 12:03 )  PTT:33.0 sec        Radiology:  CT Abdomen and Pelvis No Cont:   ACC: [id number] EXAM:  CT ABDOMEN AND PELVIS                          PROCEDURE DATE:  02/05/2022          INTERPRETATION:  CLINICAL STATEMENT: Anemia with decreasing blood counts.   Concern for retroperitoneal hematoma    TECHNIQUE: Contiguous axialCT images were obtained from the lower chest   to the pubic symphysis without intravenous contrast.  Oral contrast was   not administered.  Reformatted images in the coronal and sagittal planes   were acquired.    COMPARISON CT: February 3, 2022    OTHER STUDIES USED FOR CORRELATION: None.      FINDINGS:    LOWER CHEST: Scattered areas of tree-in-bud nodularity right middle and   lower lobes. Unchanged right basilar 0.9 cm nodule. Left basilar   atelectasis. Trace pericardial fluid.    HEPATOBILIARY: Biliary ductal dilatation, slightly improved from prior.   No suspicious parenchymal lesion..    SPLEEN: Unremarkable.    PANCREAS: Unchanged.    ADRENAL GLANDS: Unremarkable.    KIDNEYS: Retained contrast within bilateral collecting systems. No   hydronephrosis bilaterally..    ABDOMINOPELVIC NODES: Unremarkable.    PELVIC ORGANS: Retained contrast within urinary bladder. Enlarged   prostate..    PERITONEUM/MESENTERY/BOWEL: Postoperative changes of the bowel. Retained   oral contrast throughout the colon. No evidence for bowel obstruction or   pneumoperitoneum. Interval decompression of previous described distended   stomach..    BONES/SOFT TISSUES: No acute osseous abnormality. Chronic degenerative   changes, overall unchanged.    OTHER: Atherosclerosis of the abdominal aorta and branches. No   retroperitoneal hematoma.      IMPRESSION:    Since February 3, 2022;    1. No evidence for retroperitoneal hematoma.    2. Biliary ductal dilatation, slightly improved from prior.    3. Additional chronic changes, as above.    --- End of Report ---            ELLIOT LANDAU MD; Attending Radiologist  This document has been electronically signed. Feb 6 2022 10:01AM (02-05-22 @ 22:47)

## 2022-02-06 NOTE — CONSULT NOTE ADULT - TIME BILLING
as per PA note    87 yo Cantonese speaking  Male with PMH of HTN, HLD, h/o SBO , h/o abdominal surgery ( 15 years ago), BPH on Tamsulosin and Finasteride admitted to medicine with generalized weakness and dizziness Dx with anemia s/p 2 units of PRBC in ED Urology called to evaluate Pt. for CT A/P w/wo IVC (2/3/22)  findings of distended urinary bladder and right hydroperinephrosis.    Repeat CT A/P ( 2/5/22) findings shows retained contrast within bilateral collecting systems. No hydronephrosis bilaterally. Retained contrast within urinary bladder. Enlarged prostate.  Currently Pt. is able to urinate, no acute complains. On exam palpable urinary bladder.       Plan:  No acute  intervention needed at this time   RBUS to evaluate PVR , if PVR > 350cc consider CIC if patient agreeable   Cont. Flomax   Cont. Finasteride   F/U with Urology for further  management as an OP   Cont. current medical management as per primary medical team    I agree with the content of the consultation

## 2022-02-06 NOTE — CONSULT NOTE ADULT - SUBJECTIVE AND OBJECTIVE BOX
Pt is a 85yo Cantonese speaking  Male with PMH of HTN, HLD, h/o SBO , h/o abdominal surgery ( 15 years ago), BPH on Tamsulosin and Finasteride admitted to medicine with generalized weakness and dizziness Dx with anemia s/p 2 units of PRBC in ED Urology called to evaluate Pt. for CT A/P w/wo IVC (2/3/22)  findings of distended urinary bladder and right hydroperinephrosis.  Repeat CT A/P ( 2/5/22) findings shows retained contrast within bilateral collecting systems. No hydronephrosis bilaterally. Retained contrast within urinary bladder. Enlarged prostate.  Pt. seen and examined at bedside in NAD, reports no abdominal pain or flank pain. states in past had dysuria but resolved when started BPH medications, have not seen Urologist in long time, currently don't have Urologist.   Pt. reports he has no problems with urination. Bedside urinal with 200 cc of clear yellow urine. pt. denies fever chills, hematuria, dysuria, abdominal pain.     Translation  from Cantonese to English obtained with help of Language line ID #[id number]     PAST MEDICAL & SURGICAL HISTORY:  HTN (hypertension)    HLD (hyperlipidemia)    BPH (benign prostatic hyperplasia)    SBO (small bowel obstruction)    History of appendectomy        MEDICATIONS  (STANDING):  ALPRAZolam 0.5 milliGRAM(s) Oral daily  ATENolol  Tablet 25 milliGRAM(s) Oral daily  carbidopa/levodopa  10/100 1 Tablet(s) Oral three times a day  finasteride 5 milliGRAM(s) Oral daily  pantoprazole    Tablet 40 milliGRAM(s) Oral before breakfast  QUEtiapine 25 milliGRAM(s) Oral daily  tamsulosin 0.4 milliGRAM(s) Oral at bedtime         Allergies: NKDA         SOCIAL HISTORY: No illicit drug use    FAMILY HISTORY:  No pertinent family history in first degree relatives        REVIEW OF SYSTEMS   [x] A ten-point review of systems was otherwise negative except as noted.     Vital Signs Last 24 Hrs  T(C): 35.7 (06 Feb 2022 08:10), Max: 36.9 (05 Feb 2022 18:15)  T(F): 96.2 (06 Feb 2022 08:10), Max: 98.4 (05 Feb 2022 18:15)  HR: 80 (06 Feb 2022 08:10) (72 - 81)  BP: 121/58 (06 Feb 2022 08:10) (111/58 - 168/75)  RR: 18 (06 Feb 2022 08:10) (16 - 18)  SpO2: 99% (06 Feb 2022 00:06) (99% - 100%)    PHYSICAL EXAM:    GEN: NAD, awake and alert.  SKIN: Good color, non diaphoretic.  HEENT: NC/AT.  RESP: No dyspnea, non-labored breathing. No use of accessory muscles.  CARDIO: +S1/S2  ABDO: Soft, NT/ND, palpable bladder, no suprapubic tenderness to palpation   BACK: No CVAT B/L , + muscular ttp over lumbar region   : + Circumcised,  B/L descended testicles x 2. No lesions or palpable masses noted B/L. No meatal discharge.     I&O's Detail    06 Feb 2022 07:01  -  06 Feb 2022 16:20  --------------------------------------------------------  IN:  Total IN: 0 mL    OUT:    Voided (mL): 1000 mL  Total OUT: 1000 mL    Total NET: -1000 mL      LABS:                        9.0    5.53  )-----------( 171      ( 06 Feb 2022 04:30 )             27.2     02-06    136  |  103  |  22<H>  ----------------------------<  142<H>  4.4   |  23  |  1.0    Ca    8.0<L>      06 Feb 2022 04:30  Phos  3.0     02-06  Mg     2.1     02-06    TPro  5.2<L>  /  Alb  3.2<L>  /  TBili  0.2  /  DBili  x   /  AST  18  /  ALT  <5  /  AlkPhos  41  02-06    PT/INR - ( 05 Feb 2022 12:03 )   PT: 11.30 sec;   INR: 0.98 ratio         PTT - ( 05 Feb 2022 12:03 )  PTT:33.0 sec    Urinalysis (02.03.22 @ 19:00)    Glucose Qualitative, Urine: Negative    Blood, Urine: Negative    pH Urine: 6.5    Color: Light Yellow    Urine Appearance: Clear    Bilirubin: Negative    Ketone - Urine: Negative    Specific Gravity: 1.016    Protein, Urine: Negative    Urobilinogen: <2 mg/dL    Nitrite: Negative    Leukocyte Esterase Concentration: Negative        RADIOLOGY & ADDITIONAL STUDIES:  < from: CT Abdomen and Pelvis w/ Oral Cont and w/ IV Cont (02.03.22 @ 22:06) >  ACC: [id number] EXAM:  CT ABDOMEN AND PELVIS OC IC                          PROCEDURE DATE:  02/03/2022          INTERPRETATION:  Clinical History / Reason for exam: Abdominal pain.    Technique:  Contiguous axial CT images of the abdomen and pelvis were   obtained following administration of intravenous contrast.  Oral contrast   was administered.  Reformatted images in the coronal and sagittal planes   were acquired.    Comparison CT: None    FINDINGS:    LOWER CHEST: Tree-in-bud nodularity is noted in the right middle lobe and   right lower lobe. More dominant 9 mm nodule is noted in the right lower   lobe as well.    HEPATOBILIARY: A calcified granuloma is noted in the right lobe of the   liver. Moderate intrahepatic biliary dilatation with extension into the   CBD. The gallbladder is filled with stones.    SPLEEN: Unremarkable.    PANCREAS: Pancreas is mildly atrophic. The pancreatic duct measures up to   3 mm transverse    ADRENAL GLANDS: Unremarkable.    KIDNEYS: Focal diminished attenuation of the left lower renal cortex.   Mild proximal right hydroureter and hydronephrosis. Mid and distal   ureters are normal in caliber. No definite obstructing calculus is seen.   No evidence of a mass, hydronephrosis or hydroureter.    ABDOMINOPELVIC NODES: No enlarged abdominal or pelvic lymph nodes.    PELVIC ORGANS: The urinary bladder is distended with fluid. Prostate is   enlarged and measures 5.5 cm transverse.    PERITONEUM/MESENTERY/BOWEL: Multiple surgical clips are noted about the   distended stomach which is filled with food particles. The oral contrast   has reached the right colon. No evidence of oral contrast extravasation.   No bowel obstruction, ascites or free intraperitoneal air.    BONES/SOFT TISSUES: L5-S1 degenerative changes as evidenced by disc space   narrowing and grade 1 spondylolisthesis with associated lower lumbar   facet arthropathy and bilateral pars defects    VASCULAR:  The aorta is normal in caliber.      IMPRESSION:      Cholelithiasis and moderate intrahepatic and CBD dilatation. Further   evaluation with MRI/MRCP may be of benefit.    Focal diminished attenuation of the left lower renal cortex. This may   reflect evidence of pyelonephritis in the appropriate clinical setting.   This may also reflect sequela of prior insult.    Mild right hydronephrosis and proximal hydroureter. No definite   obstructing calculus is seen with mid and distal right ureter appearing   normal in caliber    Enlarged heterogeneous prostate    9 mm nodule in the right lower lobe. Nonemergent PET CT may be of benefit    Tree-in-bud nodularity in the right middle and lower lobes may reflect   sequela of an infectious process.      --- End of Report ---      ISMA ESCOBAR MD; Attending Radiologist  This document has been electronically signed. Feb  3 2022 11:23PM    < end of copied text >          < from: CT Abdomen and Pelvis No Cont (02.05.22 @ 22:47) >  ACC: [id number] EXAM:  CT ABDOMEN AND PELVIS                          PROCEDURE DATE:  02/05/2022          INTERPRETATION:  CLINICAL STATEMENT: Anemia with decreasing blood counts.   Concern for retroperitoneal hematoma    TECHNIQUE: Contiguous axialCT images were obtained from the lower chest   to the pubic symphysis without intravenous contrast.  Oral contrast was   not administered.  Reformatted images in the coronal and sagittal planes   were acquired.    COMPARISON CT: February 3, 2022    OTHER STUDIES USED FOR CORRELATION: None.      FINDINGS:    LOWER CHEST: Scattered areas of tree-in-bud nodularity right middle and   lower lobes. Unchanged right basilar 0.9 cm nodule. Left basilar   atelectasis. Trace pericardial fluid.    HEPATOBILIARY: Biliary ductal dilatation, slightly improved from prior.   No suspicious parenchymal lesion..    SPLEEN: Unremarkable.    PANCREAS: Unchanged.    ADRENAL GLANDS: Unremarkable.    KIDNEYS: Retained contrast within bilateral collecting systems. No   hydronephrosis bilaterally..    ABDOMINOPELVIC NODES: Unremarkable.    PELVIC ORGANS: Retained contrast within urinary bladder. Enlarged   prostate..    PERITONEUM/MESENTERY/BOWEL: Postoperative changes of the bowel. Retained   oral contrast throughout the colon. No evidence for bowel obstruction or   pneumoperitoneum. Interval decompression of previous described distended   stomach..    BONES/SOFT TISSUES: No acute osseous abnormality. Chronic degenerative   changes, overall unchanged.    OTHER: Atherosclerosis of the abdominal aorta and branches. No   retroperitoneal hematoma.      IMPRESSION:    Since February 3, 2022;    1. No evidence for retroperitoneal hematoma.    2. Biliary ductal dilatation, slightly improved from prior.    3. Additional chronic changes, as above.    --- End of Report ---        ELLIOT LANDAU MD; Attending Radiologist  This document has been electronically signed. Feb 6 2022 10:01AM    < end of copied text >

## 2022-02-07 LAB
ANION GAP SERPL CALC-SCNC: 9 MMOL/L — SIGNIFICANT CHANGE UP (ref 7–14)
BUN SERPL-MCNC: 18 MG/DL — SIGNIFICANT CHANGE UP (ref 10–20)
CALCIUM SERPL-MCNC: 8.2 MG/DL — LOW (ref 8.5–10.1)
CHLORIDE SERPL-SCNC: 103 MMOL/L — SIGNIFICANT CHANGE UP (ref 98–110)
CO2 SERPL-SCNC: 26 MMOL/L — SIGNIFICANT CHANGE UP (ref 17–32)
CREAT SERPL-MCNC: 0.8 MG/DL — SIGNIFICANT CHANGE UP (ref 0.7–1.5)
GLUCOSE SERPL-MCNC: 103 MG/DL — HIGH (ref 70–99)
HAPTOGLOB SERPL-MCNC: 200 MG/DL — SIGNIFICANT CHANGE UP (ref 34–200)
HCT VFR BLD CALC: 26.8 % — LOW (ref 42–52)
HGB BLD-MCNC: 8.9 G/DL — LOW (ref 14–18)
MCHC RBC-ENTMCNC: 23.9 PG — LOW (ref 27–31)
MCHC RBC-ENTMCNC: 33.2 G/DL — SIGNIFICANT CHANGE UP (ref 32–37)
MCV RBC AUTO: 72 FL — LOW (ref 80–94)
NRBC # BLD: 0 /100 WBCS — SIGNIFICANT CHANGE UP (ref 0–0)
PLATELET # BLD AUTO: 182 K/UL — SIGNIFICANT CHANGE UP (ref 130–400)
POTASSIUM SERPL-MCNC: 4 MMOL/L — SIGNIFICANT CHANGE UP (ref 3.5–5)
POTASSIUM SERPL-SCNC: 4 MMOL/L — SIGNIFICANT CHANGE UP (ref 3.5–5)
RBC # BLD: 3.72 M/UL — LOW (ref 4.7–6.1)
RBC # FLD: 21.6 % — HIGH (ref 11.5–14.5)
SODIUM SERPL-SCNC: 138 MMOL/L — SIGNIFICANT CHANGE UP (ref 135–146)
WBC # BLD: 4.66 K/UL — LOW (ref 4.8–10.8)
WBC # FLD AUTO: 4.66 K/UL — LOW (ref 4.8–10.8)

## 2022-02-07 PROCEDURE — 76770 US EXAM ABDO BACK WALL COMP: CPT | Mod: 26

## 2022-02-07 PROCEDURE — 99233 SBSQ HOSP IP/OBS HIGH 50: CPT

## 2022-02-07 PROCEDURE — 99221 1ST HOSP IP/OBS SF/LOW 40: CPT

## 2022-02-07 PROCEDURE — 74019 RADEX ABDOMEN 2 VIEWS: CPT | Mod: 26

## 2022-02-07 RX ADMIN — CARBIDOPA AND LEVODOPA 1 TABLET(S): 25; 100 TABLET ORAL at 13:54

## 2022-02-07 RX ADMIN — FINASTERIDE 5 MILLIGRAM(S): 5 TABLET, FILM COATED ORAL at 11:04

## 2022-02-07 RX ADMIN — Medication 0.5 MILLIGRAM(S): at 11:04

## 2022-02-07 RX ADMIN — TAMSULOSIN HYDROCHLORIDE 0.4 MILLIGRAM(S): 0.4 CAPSULE ORAL at 23:14

## 2022-02-07 RX ADMIN — QUETIAPINE FUMARATE 25 MILLIGRAM(S): 200 TABLET, FILM COATED ORAL at 11:04

## 2022-02-07 RX ADMIN — PANTOPRAZOLE SODIUM 40 MILLIGRAM(S): 20 TABLET, DELAYED RELEASE ORAL at 06:13

## 2022-02-07 RX ADMIN — CARBIDOPA AND LEVODOPA 1 TABLET(S): 25; 100 TABLET ORAL at 06:13

## 2022-02-07 RX ADMIN — ATENOLOL 25 MILLIGRAM(S): 25 TABLET ORAL at 06:12

## 2022-02-07 RX ADMIN — CARBIDOPA AND LEVODOPA 1 TABLET(S): 25; 100 TABLET ORAL at 23:14

## 2022-02-07 NOTE — PATIENT PROFILE ADULT - FALL HARM RISK - HARM RISK INTERVENTIONS
Assistance with ambulation/Assistance OOB with selected safe patient handling equipment/Communicate Risk of Fall with Harm to all staff/Monitor for mental status changes/Move patient closer to nurses' station/Reinforce activity limits and safety measures with patient and family/Reorient to person, place and time as needed/Tailored Fall Risk Interventions/Toileting schedule using arm’s reach rule for commode and bathroom/Use of alarms - bed, chair and/or voice tab/Visual Cue: Yellow wristband and red socks/Bed in lowest position, wheels locked, appropriate side rails in place/Call bell, personal items and telephone in reach/Instruct patient to call for assistance before getting out of bed or chair/Non-slip footwear when patient is out of bed/[place] to call system/Physically safe environment - no spills, clutter or unnecessary equipment/Purposeful Proactive Rounding/Room/bathroom lighting operational, light cord in reach

## 2022-02-07 NOTE — PROGRESS NOTE ADULT - ASSESSMENT
# Acute on Chronic Microcytic Anemia  - no clear source of bleeding noted   -  microcytic anemia   - s/p 2 units of prbc in ed -  - CT Chest w/IV Contrast demonstrated  Bilateral centrilobular nodular opacities consistent with small  airways infection/inflammation, Pulmonary nodules measuring up to 9 mm. Intrahepatic and extrahepatic biliary ductal dilatation with mild  dilatation of the pancreatic duct. Gastric wall thickening likely due to underdistention  - CT Abdomen/Pelvis 2/3/22 demonstrated Cholelithiasis and moderate intrahepatic and CBD dilatation  JIHAN recomended EGD and colonoscopy-- but patient apprehensive  on protonix  Dilated PD and CBD-- MRCP recommended--     # Mild right hydronephrosis and proximal hydroureter  patient seen urology-- recommend renal bladder sonogram and CIC if patient agrees  -   -      # ? parkinson/jose body dementia  - ? on sinemet, klonopin, xanax and quetiapine   - confirm home dose and continue     # HTN  - atenolol on hold due to borderline BP     # B12 def  c/w b12 1000mcg PO qdaily    # GERD  - c/w PPI    #DVT ppx: SCD, switch to chemical proph if Hb remains stable after transfusion .     called patient's family-- left a message with my spectra to call me back. no answer.  DC planning if family not agreeable.

## 2022-02-07 NOTE — PROGRESS NOTE ADULT - ASSESSMENT
86 year old M with a PMx of an unclear abdominal sx history (has had abdominal surgery in [facility] many years ago and 1 in [place] about 15 years ago as well, but unknown which hospital), SBO managed medically at [facility] 1/2022, HTN, Questionable history of parkinson's disease (patient takes sinemet at home), BPH, B12 deficiency, GERD, presents for a two week history of generalized weakness and dizziness.    #Acute on Chronic Microcytic Anemia  GIB not suspected as patient had a negative SHMUEL   CBC demonstrated a Hgb downtrend from 8.4 to 5.4, CMP demonstrates  no electrolyte abnormalities and no evidence of ALI or OLLIE, Iron studies significant for RICHARD but ferritin is pending, , BUN 38/SCr 1.1.    Iron 107, TIBC 175, Transferrin 121, Ferritin pending  CT Chest w/IV Contrast demonstrated  Bilateral centrilobular nodular opacities consistent with small   airways infection/inflammation, Pulmonary nodules measuring up to 9 mm.Intrahepatic and extrahepatic biliary ductal dilatation with mild  dilatation of the pancreatic duct. Gastric wall thickening likely due to underdistention, CT Abdomen/Pelvis 2/3/22 demonstrated Cholelithiasis and moderate intrahepatic and CBD dilatation   - Trend Hb via daily CBC; transfuse if Hb <7  - Maintain active T+S  - f/u Hb electrophoresis to evaluate for thalassemia   -GI consulted: egd and colono, patient is still thinking about it and wants to discuss with the family    #Dilated Common Bile Duct  Admission CTAP demonstrated cholelithiasis and moderate intrahepatic biliary dilatation with extension into the CBD.  - Consider MRCP per GI    #Parkinson Disease  Managed outpatient w/ Sinemet  TID. Per granddaughter, patient has had visual hallucinations over the past several years and is increasingly agitated at night. Patient is also on klonopin and xanax as well but frequency and doses are unknown. Patient also takes quetiapine 25mg QD. Patient has had imaging of his brain in the past but granddaughter is unsure where. Per neurology teamn, patient has mild parkinsonian features on physical exam.  - c/w Sinemet () 1 tab PO TID per neurology  - c/w quetiapine 25mg PO QHS as 2nd gen anti-psychotics have low risk for worsening parkinsonian symptoms  - f/u outpatient neurologist in [place] for further Sinemet management  - avoid dopamine blocker agents  - consider d/c xanax given high morbidity in elderly patients    #Hypertension  Managed outpatient w/ atenolol 25mg QD. Currently held in setting of acute anemia and orthostatic hypotension.  - continue to hold atenolol    #GERD  Managed outpatient w/ dexlansoprazole 60mg QD  - c/w pantoprazole 40mg PO QD (therapeutic interchange for dexlansoprazole)    #Benign Prostate Hyperplasia  Manged outpatient w/ dutasteride 0.5mg QD and tamsulosin .4mg QD. CTAP 2/3 demonstrated mild proximal right hydroureter and hydronephrosis. However, no hydronephrosis or hydroureter visualized on repeat CTAP 2/5. Enlarged prostate seen on both CT scans. No acute  intervention at this time per urology.  - c/w finasteride 5mg PO QD (therapeutic interchange for dutasteride)  - c/w tamsulosin 0.4mg PO QHS  - f/u RBUS to evaluate PVR; if PVR >350cc consider CIC if patient agreeable  - f/u outpatient w/ urology    DVT PPX: SCD's; no chemoprophylaxis due to concern for GI bleed  GI PPX: pantoprazole 40mg PO QD  DIET: DASH/TLC  ACTIVITY: AAT  CODE STATUS: Full Code  DISPOSITION: From Home    PENDING:

## 2022-02-07 NOTE — CONSULT NOTE ADULT - REASON FOR ADMISSION
Acute on Chronic Anemia, Abdominal Pain

## 2022-02-07 NOTE — CONSULT NOTE ADULT - ASSESSMENT
ASSESSMENT:  86yM w/ PMHx of  ***  who presented with ***. Physical exam findings, imaging, and labs as documented above.     PLAN:  -  -  -    Lines/Tubes: PIV, Midline, Central Line, A-Line, Chest tubes, Shawn/NISHANT drains, Max Catheter.    Above plan discussed with Attending Surgeon  ***  , patient, patient family, and Primary team  02-07-22 @ 13:34   ASSESSMENT:  86/M with unknown gastric surgery, SBO managed medically at [facility] 1/2022, HTN, parkinson's disease, BPH, B12 deficiency, GERD, presents for a two week history of generalized weakness, nasuea vomiting.    PLAN:  - Recommend abdominal XR  - GI for endoscopy   - NPO if vomits         Above plan discussed with Attending Surgeon Dr. Sliva , patient, patient family, and Primary team  02-07-22 @ 13:34   ASSESSMENT:  86/M with unknown gastric surgery, SBO managed medically at [facility] 1/2022, HTN, parkinson's disease, BPH, B12 deficiency, GERD, presents for a two week history of generalized weakness, nasuea vomiting.    PLAN:  - Recommend abdominal XR  - Recall GI for endoscopy while inpatient patient more agreeable to scope  - Recommend CLD, NPO if vomits    Above plan discussed with Attending Surgeon Dr. Silva , patient, patient family, and Primary team  02-07-22 @ 13:34

## 2022-02-07 NOTE — CONSULT NOTE ADULT - SUBJECTIVE AND OBJECTIVE BOX
GENERAL SURGERY CONSULT NOTE    Patient: LUCIA OVALLES , 86y ([birth year])Male   MRN: [id number]  Location: [facility] F4-4B 018 B  Visit: 02-05-22 Inpatient  Date: 02-07-22 @ 13:34    HPI:  This is an 86 year old M with a PMx of an unclear abdominal sx history (has had abdominal surgery in [facility] many years ago and 1 in [place] about 15 years ago as well, but unknown which hospital), SBO managed medically at [facility] 1/2022, HTN, Questionable history of parkinson's disease (patient takes sinemet at home), BPH, B12 deficiency, GERD, presents for a two week history of generalized weakness and dizziness.  The patient has endorsed having trouble ambulating due to his fatigue.  He also endorses dizziness, which is exacerbated by standing up (granddaughter endorses history of hypotension).  Patient denies any bloody bowel movements or any episodes of hematemesis during his presentation.  Patient has endorsed abdominal pain that is cramping and feels as if his insides are "twisting" and it occurs intermittently and is moderate in severity and endorses an episode of NBNB prior to admission.  Patient endorses losing 1-2lbs in the 12 months and endorses a good appetite.  His granddaughter who is a neurology resident at [employer] ([phone]) endorses that the patient has a history of orthostatic hypotension.  He has been prescribed sinemet due to incoordination in his gait, describing his gait similar to penguin.  Granddaughter denies history of hematologic issues in the family.  She is also concerned of polypharmacy as patient is on an anti-psychotic, two benzo medications along with sinemet. Patient denies chest pain, sob, fever, rigors, exertional dyspnea or dyspnea at rest.     General surgery consulted for persistent nasuea/vomiting. Patient with remote history of unknown gastric surgery, for unknown reason to patient or family, recently admitted to [facility] for SBO that was treated non-operatively. He presented to the ED 2/3 for similar symptoms, CT at that time showing no bowel obstruction, but dilated stomach.  He represented with CT on 2/5 - resolution of prior gastric distension no bowl obstruction.       PAST MEDICAL & SURGICAL HISTORY:  HTN (hypertension)  HLD (hyperlipidemia)  BPH (benign prostatic hyperplasia)  SBO (small bowel obstruction)  History of appendectomy    Home Medications:  ALPRAZOLAM 0.5MG TAB: 1 tab(s) orally once a day (05 Feb 2022 19:39)  ATENOLOL 25MG TAB:  (05 Feb 2022 19:36)  CARBIDOPA/LEVODOPA 10-100MG TAB:  (05 Feb 2022 19:36)  DEXILANT 60MG DR CAP:  (05 Feb 2022 19:36)  DUTASTERIDE 0.5MG CAP: 1 cap(s) orally once a day (05 Feb 2022 19:36)  QUETIAPINE FUMARATE 25MG TAB: 1 tab(s) orally once a day (05 Feb 2022 19:38)  TAMSULOSIN HYDROCHLORIDE 0.4MG CAP:  (05 Feb 2022 19:36)    VITALS:  T(F): 97 (02-07-22 @ 07:59), Max: 98.5 (02-06-22 @ 16:45)  HR: 72 (02-07-22 @ 07:59) (72 - 72)  BP: 109/55 (02-07-22 @ 07:59) (109/55 - 121/56)  RR: 18 (02-07-22 @ 07:59) (18 - 19)  SpO2: --    PHYSICAL EXAM:  General: NAD, AAOx3, calm and cooperative  HEENT: NCAT, EVER, EOMI, Trachea ML, Neck supple  Cardiac: RRR S1, S2, no Murmurs, rubs or gallops  Respiratory: CTAB, normal respiratory effort, breath sounds equal BL, no wheeze, rhonchi or crackles  Abdomen: Soft, non-distended, non-tender, no rebound, no guarding. +BS.  Rectal: Good tone, +stool, no blood, no heather-anal masses/lesions, no fistulas, fissures, hemorrhoids  Musculoskeletal: Strength 5/5 BL UE/LE, ROM intact, compartments soft  Neuro: Sensation grossly intact and equal throughout, no focal deficits  Vascular: Pulses 2+ throughout, extremities well perfused  Skin: Warm/dry, normal color, no jaundice  Incision/wound: healing well, dressings in place, clean, dry and intact    MEDICATIONS  (STANDING):  ALPRAZolam 0.5 milliGRAM(s) Oral daily  ATENolol  Tablet 25 milliGRAM(s) Oral daily  carbidopa/levodopa  10/100 1 Tablet(s) Oral three times a day  finasteride 5 milliGRAM(s) Oral daily  pantoprazole    Tablet 40 milliGRAM(s) Oral before breakfast  QUEtiapine 25 milliGRAM(s) Oral daily  tamsulosin 0.4 milliGRAM(s) Oral at bedtime    MEDICATIONS  (PRN):      LAB/STUDIES:                        8.9    4.66  )-----------( 182      ( 07 Feb 2022 07:40 )             26.8     02-07    138  |  103  |  18  ----------------------------<  103<H>  4.0   |  26  |  0.8    Ca    8.2<L>      07 Feb 2022 07:40  Phos  3.0     02-06  Mg     2.1     02-06    TPro  5.2<L>  /  Alb  3.2<L>  /  TBili  0.2  /  DBili  x   /  AST  18  /  ALT  <5  /  AlkPhos  41  02-06      LIVER FUNCTIONS - ( 06 Feb 2022 04:30 )  Alb: 3.2 g/dL / Pro: 5.2 g/dL / ALK PHOS: 41 U/L / ALT: <5 U/L / AST: 18 U/L / GGT: x               IMAGING:  < from: CT Abdomen and Pelvis No Cont (02.05.22 @ 22:47) >  FINDINGS:    LOWER CHEST: Scattered areas of tree-in-bud nodularity right middle and   lower lobes. Unchanged right basilar 0.9 cm nodule. Left basilar   atelectasis. Trace pericardial fluid.    HEPATOBILIARY: Biliary ductal dilatation, slightly improved from prior.   No suspicious parenchymal lesion..    SPLEEN: Unremarkable.    PANCREAS: Unchanged.    ADRENAL GLANDS: Unremarkable.    KIDNEYS: Retained contrast within bilateral collecting systems. No   hydronephrosis bilaterally..    ABDOMINOPELVIC NODES: Unremarkable.    PELVIC ORGANS: Retained contrast within urinary bladder. Enlarged   prostate..    PERITONEUM/MESENTERY/BOWEL: Postoperative changes of the bowel. Retained   oral contrast throughout the colon. No evidence for bowel obstruction or   pneumoperitoneum. Interval decompression of previous described distended   stomach..    BONES/SOFT TISSUES: No acute osseous abnormality. Chronic degenerative   changes, overall unchanged.    OTHER: Atherosclerosis of the abdominal aorta and branches. No   retroperitoneal hematoma.      IMPRESSION:    Since February 3, 2022;    1. No evidence for retroperitoneal hematoma.    2. Biliary ductal dilatation, slightly improved from prior.    3. Additional chronic changes, as above.    < end of copied text >       GENERAL SURGERY CONSULT NOTE    Patient: LUCIA OVALLES , 86y ([birth year])Male   MRN: [id number]  Location: [facility] F4-4B 018 B  Visit: 02-05-22 Inpatient  Date: 02-07-22 @ 13:34    HPI:  This is an 86 year old M with a PMx of an unclear abdominal sx history (has had abdominal surgery in [facility] many years ago and 1 in [place] about 15 years ago as well, but unknown which hospital), SBO managed medically at [facility] 1/2022, HTN, Questionable history of parkinson's disease (patient takes sinemet at home), BPH, B12 deficiency, GERD, presents for a two week history of generalized weakness and dizziness.  The patient has endorsed having trouble ambulating due to his fatigue.  He also endorses dizziness, which is exacerbated by standing up (granddaughter endorses history of hypotension).  Patient denies any bloody bowel movements or any episodes of hematemesis during his presentation.  Patient has endorsed abdominal pain that is cramping and feels as if his insides are "twisting" and it occurs intermittently and is moderate in severity and endorses an episode of NBNB prior to admission.  Patient endorses losing 1-2lbs in the 12 months and endorses a good appetite.  His granddaughter who is a neurology resident at [facility] ([phone]) endorses that the patient has a history of orthostatic hypotension.  He has been prescribed sinemet due to incoordination in his gait, describing his gait similar to penguin.  Granddaughter denies history of hematologic issues in the family.  She is also concerned of polypharmacy as patient is on an anti-psychotic, two benzo medications along with sinemet. Patient denies chest pain, sob, fever, rigors, exertional dyspnea or dyspnea at rest.     General surgery consulted for persistent nasuea/vomiting. Patient with remote history of unknown gastric surgery, for unknown reason to patient or family, recently admitted to [facility] for SBO that was treated non-operatively. He presented to the ED 2/3 for similar symptoms, CT at that time showing no bowel obstruction, but dilated stomach.  He represented with CT on 2/5 - resolution of prior gastric distension no bowl obstruction.       PAST MEDICAL & SURGICAL HISTORY:  HTN (hypertension)  HLD (hyperlipidemia)  BPH (benign prostatic hyperplasia)  SBO (small bowel obstruction)  History of appendectomy    Home Medications:  ALPRAZOLAM 0.5MG TAB: 1 tab(s) orally once a day (05 Feb 2022 19:39)  ATENOLOL 25MG TAB:  (05 Feb 2022 19:36)  CARBIDOPA/LEVODOPA 10-100MG TAB:  (05 Feb 2022 19:36)  DEXILANT 60MG DR CAP:  (05 Feb 2022 19:36)  DUTASTERIDE 0.5MG CAP: 1 cap(s) orally once a day (05 Feb 2022 19:36)  QUETIAPINE FUMARATE 25MG TAB: 1 tab(s) orally once a day (05 Feb 2022 19:38)  TAMSULOSIN HYDROCHLORIDE 0.4MG CAP:  (05 Feb 2022 19:36)    VITALS:  T(F): 97 (02-07-22 @ 07:59), Max: 98.5 (02-06-22 @ 16:45)  HR: 72 (02-07-22 @ 07:59) (72 - 72)  BP: 109/55 (02-07-22 @ 07:59) (109/55 - 121/56)  RR: 18 (02-07-22 @ 07:59) (18 - 19)  SpO2: --    PHYSICAL EXAM:  General: NAD, AAOx3  HEENT: NCAT, EVER  Cardiac: RRR S1, S2  Respiratory: CTAB, normal respiratory effort  Abdomen: Soft, mildly, distended, non-tender, no rebound, no guarding  Musculoskeletal: ROM intact, compartments soft    MEDICATIONS  (STANDING):  ALPRAZolam 0.5 milliGRAM(s) Oral daily  ATENolol  Tablet 25 milliGRAM(s) Oral daily  carbidopa/levodopa  10/100 1 Tablet(s) Oral three times a day  finasteride 5 milliGRAM(s) Oral daily  pantoprazole    Tablet 40 milliGRAM(s) Oral before breakfast  QUEtiapine 25 milliGRAM(s) Oral daily  tamsulosin 0.4 milliGRAM(s) Oral at bedtime    MEDICATIONS  (PRN):      LAB/STUDIES:                        8.9    4.66  )-----------( 182      ( 07 Feb 2022 07:40 )             26.8     02-07    138  |  103  |  18  ----------------------------<  103<H>  4.0   |  26  |  0.8    Ca    8.2<L>      07 Feb 2022 07:40  Phos  3.0     02-06  Mg     2.1     02-06    TPro  5.2<L>  /  Alb  3.2<L>  /  TBili  0.2  /  DBili  x   /  AST  18  /  ALT  <5  /  AlkPhos  41  02-06      LIVER FUNCTIONS - ( 06 Feb 2022 04:30 )  Alb: 3.2 g/dL / Pro: 5.2 g/dL / ALK PHOS: 41 U/L / ALT: <5 U/L / AST: 18 U/L / GGT: x               IMAGING:  < from: CT Abdomen and Pelvis No Cont (02.05.22 @ 22:47) >  FINDINGS:    LOWER CHEST: Scattered areas of tree-in-bud nodularity right middle and   lower lobes. Unchanged right basilar 0.9 cm nodule. Left basilar   atelectasis. Trace pericardial fluid.    HEPATOBILIARY: Biliary ductal dilatation, slightly improved from prior.   No suspicious parenchymal lesion..    SPLEEN: Unremarkable.    PANCREAS: Unchanged.    ADRENAL GLANDS: Unremarkable.    KIDNEYS: Retained contrast within bilateral collecting systems. No   hydronephrosis bilaterally..    ABDOMINOPELVIC NODES: Unremarkable.    PELVIC ORGANS: Retained contrast within urinary bladder. Enlarged   prostate..    PERITONEUM/MESENTERY/BOWEL: Postoperative changes of the bowel. Retained   oral contrast throughout the colon. No evidence for bowel obstruction or   pneumoperitoneum. Interval decompression of previous described distended   stomach..    BONES/SOFT TISSUES: No acute osseous abnormality. Chronic degenerative   changes, overall unchanged.    OTHER: Atherosclerosis of the abdominal aorta and branches. No   retroperitoneal hematoma.      IMPRESSION:    Since February 3, 2022;    1. No evidence for retroperitoneal hematoma.    2. Biliary ductal dilatation, slightly improved from prior.    3. Additional chronic changes, as above.    < end of copied text >

## 2022-02-07 NOTE — CONSULT NOTE ADULT - CONSULT REASON
abdominal pain
CT A/P w/wo IVC  findings of dilated bladder and right hydroureteronephrosis
Questionable Hx of Parkinson's disease, ptn on home sinemet.
nausea vomiting

## 2022-02-07 NOTE — CONSULT NOTE ADULT - ATTENDING COMMENTS
Profound anemia. Recommended EGD and colonoscopy. Patient will discuss with family.  Dilated PD and CBD. normal LFTs. Will need MRI MRCP to rule put malignancy or other strictures/obstructions
85 yo M w/ h/o ? IPD on low dose Sinemet at baseline currently admitted for abdominal pain found with severe anemia.  Pt has minimal parkinsonism on exam.  Follows with neurologist in [place].  Recommend continuing home dose of Sinemet and may f/u as outpt for further dose adjustment.  Recommend d/c Xanax since higher morbidity with elderly pts.  call back as needed.
Patient is an 86M with PMH of HTN, Parkinson's disease, BPH, GERD, unknown gastric surgery many years ago and recent admission for SBO managed medically at [facility] 1/2022 presents with weakness nausea and vomiting.  Recently seen in [facility] ER.  Found to be anemic with Hgb of 5.4    Patient seen and examined.  Appears comfortable    Abdomen - soft, non distended, non tender.  Well healed laparotomy scar    Vitals labs and images reviewed    PLAN:  - repeat KUB  - f/u GI regarding EGD colonoscopy given anemia and history of gastric surgery  - CLD   - trend hgb

## 2022-02-08 ENCOUNTER — TRANSCRIPTION ENCOUNTER (OUTPATIENT)
Age: 87
End: 2022-02-08

## 2022-02-08 LAB
BASOPHILS # BLD AUTO: 0.08 K/UL — SIGNIFICANT CHANGE UP (ref 0–0.2)
BASOPHILS NFR BLD AUTO: 1.3 % — HIGH (ref 0–1)
BLD GP AB SCN SERPL QL: SIGNIFICANT CHANGE UP
EOSINOPHIL # BLD AUTO: 0.21 K/UL — SIGNIFICANT CHANGE UP (ref 0–0.7)
EOSINOPHIL NFR BLD AUTO: 3.5 % — SIGNIFICANT CHANGE UP (ref 0–8)
HCT VFR BLD CALC: 31 % — LOW (ref 42–52)
HGB BLD-MCNC: 10.1 G/DL — LOW (ref 14–18)
IMM GRANULOCYTES NFR BLD AUTO: 9.1 % — HIGH (ref 0.1–0.3)
LYMPHOCYTES # BLD AUTO: 0.92 K/UL — LOW (ref 1.2–3.4)
LYMPHOCYTES # BLD AUTO: 15.2 % — LOW (ref 20.5–51.1)
MCHC RBC-ENTMCNC: 24.2 PG — LOW (ref 27–31)
MCHC RBC-ENTMCNC: 32.6 G/DL — SIGNIFICANT CHANGE UP (ref 32–37)
MCV RBC AUTO: 74.2 FL — LOW (ref 80–94)
MONOCYTES # BLD AUTO: 0.68 K/UL — HIGH (ref 0.1–0.6)
MONOCYTES NFR BLD AUTO: 11.2 % — HIGH (ref 1.7–9.3)
NEUTROPHILS # BLD AUTO: 3.63 K/UL — SIGNIFICANT CHANGE UP (ref 1.4–6.5)
NEUTROPHILS NFR BLD AUTO: 59.7 % — SIGNIFICANT CHANGE UP (ref 42.2–75.2)
NRBC # BLD: 0 /100 WBCS — SIGNIFICANT CHANGE UP (ref 0–0)
PLATELET # BLD AUTO: 194 K/UL — SIGNIFICANT CHANGE UP (ref 130–400)
RBC # BLD: 4.18 M/UL — LOW (ref 4.7–6.1)
RBC # FLD: 23 % — HIGH (ref 11.5–14.5)
WBC # BLD: 6.07 K/UL — SIGNIFICANT CHANGE UP (ref 4.8–10.8)
WBC # FLD AUTO: 6.07 K/UL — SIGNIFICANT CHANGE UP (ref 4.8–10.8)

## 2022-02-08 PROCEDURE — 99239 HOSP IP/OBS DSCHRG MGMT >30: CPT

## 2022-02-08 PROCEDURE — 74018 RADEX ABDOMEN 1 VIEW: CPT | Mod: 26

## 2022-02-08 RX ORDER — ACETAMINOPHEN 500 MG
650 TABLET ORAL EVERY 6 HOURS
Refills: 0 | Status: DISCONTINUED | OUTPATIENT
Start: 2022-02-08 | End: 2022-02-11

## 2022-02-08 RX ORDER — ATENOLOL 25 MG/1
0 TABLET ORAL
Qty: 0 | Refills: 0 | DISCHARGE

## 2022-02-08 RX ORDER — DEXLANSOPRAZOLE 30 MG/1
0 CAPSULE, DELAYED RELEASE ORAL
Qty: 0 | Refills: 0 | DISCHARGE

## 2022-02-08 RX ORDER — TAMSULOSIN HYDROCHLORIDE 0.4 MG/1
0 CAPSULE ORAL
Qty: 0 | Refills: 0 | DISCHARGE

## 2022-02-08 RX ORDER — ALPRAZOLAM 0.25 MG
1 TABLET ORAL
Qty: 0 | Refills: 0 | DISCHARGE

## 2022-02-08 RX ORDER — CARBIDOPA AND LEVODOPA 25; 100 MG/1; MG/1
0 TABLET ORAL
Qty: 0 | Refills: 0 | DISCHARGE

## 2022-02-08 RX ORDER — QUETIAPINE FUMARATE 200 MG/1
1 TABLET, FILM COATED ORAL
Qty: 0 | Refills: 0 | DISCHARGE

## 2022-02-08 RX ORDER — POLYETHYLENE GLYCOL 3350 17 G/17G
17 POWDER, FOR SOLUTION ORAL
Qty: 510 | Refills: 0
Start: 2022-02-08 | End: 2022-03-09

## 2022-02-08 RX ADMIN — Medication 650 MILLIGRAM(S): at 15:41

## 2022-02-08 RX ADMIN — CARBIDOPA AND LEVODOPA 1 TABLET(S): 25; 100 TABLET ORAL at 21:40

## 2022-02-08 RX ADMIN — QUETIAPINE FUMARATE 25 MILLIGRAM(S): 200 TABLET, FILM COATED ORAL at 11:06

## 2022-02-08 RX ADMIN — CARBIDOPA AND LEVODOPA 1 TABLET(S): 25; 100 TABLET ORAL at 16:36

## 2022-02-08 RX ADMIN — CARBIDOPA AND LEVODOPA 1 TABLET(S): 25; 100 TABLET ORAL at 05:45

## 2022-02-08 RX ADMIN — FINASTERIDE 5 MILLIGRAM(S): 5 TABLET, FILM COATED ORAL at 11:06

## 2022-02-08 RX ADMIN — TAMSULOSIN HYDROCHLORIDE 0.4 MILLIGRAM(S): 0.4 CAPSULE ORAL at 21:39

## 2022-02-08 RX ADMIN — ATENOLOL 25 MILLIGRAM(S): 25 TABLET ORAL at 05:46

## 2022-02-08 RX ADMIN — PANTOPRAZOLE SODIUM 40 MILLIGRAM(S): 20 TABLET, DELAYED RELEASE ORAL at 08:17

## 2022-02-08 RX ADMIN — Medication 0.5 MILLIGRAM(S): at 11:06

## 2022-02-08 NOTE — DISCHARGE NOTE PROVIDER - CARE PROVIDERS DIRECT ADDRESSES
,DirectAddress_Unknown ,DirectAddress_Unknown,[email or web address]

## 2022-02-08 NOTE — DISCHARGE NOTE PROVIDER - PROVIDER TOKENS
PROVIDER:[TOKEN:[[id number]],FOLLOWUP:[1 week],ESTABLISHEDPATIENT:[T]] PROVIDER:[TOKEN:[[id number]],FOLLOWUP:[1 week],ESTABLISHEDPATIENT:[T]],PROVIDER:[TOKEN:[[id number]]]

## 2022-02-08 NOTE — DISCHARGE NOTE NURSING/CASE MANAGEMENT/SOCIAL WORK - NSDCPEFALRISK_GEN_ALL_CORE
For information on Fall & Injury Prevention, visit: [email or web address] OR  [email or web address] OR  https://www.cdc.gov/steadi/patient.html

## 2022-02-08 NOTE — DISCHARGE NOTE NURSING/CASE MANAGEMENT/SOCIAL WORK - PATIENT PORTAL LINK FT
You can access the FollowMyHealth Patient Portal offered by [facility] by registering at the following website: [email or web address]. By joining "[employer]’s FollowMyHealth portal, you will also be able to view your health information using other applications (apps) compatible with our system.

## 2022-02-08 NOTE — DISCHARGE NOTE PROVIDER - NSDCCPCAREPLAN_GEN_ALL_CORE_FT
PRINCIPAL DISCHARGE DIAGNOSIS  Diagnosis: Severe anemia  Assessment and Plan of Treatment: You came to the hospital with generalized weakness and dizziness. Blood work revealed you had a severe anemia, or low red blood cell counts. You were given two units of blood and repeat bloodwork demonstrated an appropriate increase in your red blood cell counts. The gastroenterlogy team recommended an endoscopy and colonoscopy while inpatient to evaluate for a possible source of bleeding, but have declined at this moment to instead follow up as an outpatient.  Please follow up with your gastroenterologist as within one week after discharge to set up an appointment and discuss your recent hospitalization. Additionally, please follow up with your primary care physician within one week to have repeat blood work performed and make sure your blood levels remain stable.       PRINCIPAL DISCHARGE DIAGNOSIS  Diagnosis: Severe anemia  Assessment and Plan of Treatment: You came to the hospital with generalized weakness and dizziness. Blood work revealed you had a severe anemia, or low red blood cell counts. You were given two units of blood and repeat bloodwork demonstrated an appropriate increase in your red blood cell counts. The gastroenterlogy team recommended an endoscopy and colonoscopy while inpatient to evaluate for a possible source of bleeding. The endoscopy results were normal and the colonoscopy showed nonbleeding internal and external hemorrhoids.   Please follow up with Dr. Silva withing 1-2 weeks after discharge.   Additionally, please follow up with your primary care physician within one week to have repeat blood work performed and make sure your blood levels remain stable.

## 2022-02-08 NOTE — DISCHARGE NOTE PROVIDER - NSDCMRMEDTOKEN_GEN_ALL_CORE_FT
ATENOLOL 25MG TAB: tab(s) orally once a day  CARBIDOPA/LEVODOPA 10-100MG TAB: tab(s) orally 2 times a day  DEXILANT 60MG DR CAP: cap(s) orally once a day  DUTASTERIDE 0.5MG CAP: 1 cap(s) orally once a day  QUETIAPINE FUMARATE 25MG TAB: 1 tab(s) orally once a day  TAMSULOSIN HYDROCHLORIDE 0.4MG CAP: cap(s) orally once a day  Zofran 4 mg oral tablet: 1 tab(s) orally 2 times a day, As Needed -for nausea    ATENOLOL 25MG TAB: tab(s) orally once a day  CARBIDOPA/LEVODOPA 10-100MG TAB: tab(s) orally 2 times a day  DEXILANT 60MG DR CAP: cap(s) orally once a day  DUTASTERIDE 0.5MG CAP: 1 cap(s) orally once a day  MiraLax oral powder for reconstitution: 17 gram(s) orally once a day   TAMSULOSIN HYDROCHLORIDE 0.4MG CAP: cap(s) orally once a day  Zofran 4 mg oral tablet: 1 tab(s) orally 2 times a day, As Needed -for nausea    ATENOLOL 25MG TAB: tab(s) orally once a day  CARBIDOPA/LEVODOPA 10-100MG TAB: tab(s) orally 2 times a day  DEXILANT 60MG DR CAP: cap(s) orally once a day  DUTASTERIDE 0.5MG CAP: 1 cap(s) orally once a day  QUEtiapine 25 mg oral tablet: 1 tab(s) orally once a day  TAMSULOSIN HYDROCHLORIDE 0.4MG CAP: cap(s) orally once a day

## 2022-02-08 NOTE — PROGRESS NOTE ADULT - ASSESSMENT
86 year old M with a PMx of an unclear abdominal sx history (has had abdominal surgery in [facility] many years ago and 1 in [place] about 15 years ago as well, but unknown which hospital), SBO managed medically at [facility] 1/2022, HTN, Questionable history of parkinson's disease (patient takes sinemet at home), BPH, B12 deficiency, GERD, presents for a two week history of generalized weakness and dizziness.    #Acute on Chronic Microcytic Anemia  GIB not suspected as patient had a negative SHMUEL   CBC demonstrated a Hgb downtrend from 8.4 to 5.4, CMP demonstrates  no electrolyte abnormalities and no evidence of ALI or OLLIE, Iron studies significant for RICHARD but ferritin is pending, , BUN 38/SCr 1.1.    Iron 107, TIBC 175, Transferrin 121, Ferritin pending  CT Chest w/IV Contrast demonstrated  Bilateral centrilobular nodular opacities consistent with small   airways infection/inflammation, Pulmonary nodules measuring up to 9 mm.Intrahepatic and extrahepatic biliary ductal dilatation with mild  dilatation of the pancreatic duct. Gastric wall thickening likely due to underdistention, CT Abdomen/Pelvis 2/3/22 demonstrated Cholelithiasis and moderate intrahepatic and CBD dilatation   - Trend Hb via daily CBC; transfuse if Hb <7  - Maintain active T+S  - f/u Hb electrophoresis to evaluate for thalassemia   -GI consulted: egd and colono, patient is still thinking about it and wants to discuss with the family    #Dilated Common Bile Duct  Admission CTAP demonstrated cholelithiasis and moderate intrahepatic biliary dilatation with extension into the CBD. GI recommending MRCP, but patient currently w/o evidence of abdominal pain.    #Parkinson Disease  Managed outpatient w/ Sinemet  TID. Per granddaughter, patient has had visual hallucinations over the past several years and is increasingly agitated at night. Patient is also on klonopin and xanax as well but frequency and doses are unknown. Patient also takes quetiapine 25mg QD. Patient has had imaging of his brain in the past but granddaughter is unsure where. Per neurology teamn, patient has mild parkinsonian features on physical exam.  - c/w Sinemet () 1 tab PO TID per neurology  - c/w quetiapine 25mg PO QHS as 2nd gen anti-psychotics have low risk for worsening parkinsonian symptoms  - f/u outpatient neurologist in [place] for further Sinemet management  - avoid dopamine blocker agents  - d/c xanax given high morbidity in elderly patients    #Hypertension  Managed outpatient w/ atenolol 25mg QD. Currently held in setting of acute anemia and orthostatic hypotension.  - continue to hold atenolol    #GERD  Managed outpatient w/ dexlansoprazole 60mg QD  - c/w pantoprazole 40mg PO QD (therapeutic interchange for dexlansoprazole)    #Benign Prostate Hyperplasia  Manged outpatient w/ dutasteride 0.5mg QD and tamsulosin .4mg QD. CTAP 2/3 demonstrated mild proximal right hydroureter and hydronephrosis. However, no hydronephrosis or hydroureter visualized on repeat CTAP 2/5. Enlarged prostate seen on both CT scans. RBUS demonstrated PVR <100c. No acute  intervention at this time per urology.  - c/w finasteride 5mg PO QD (therapeutic interchange for dutasteride)  - c/w tamsulosin 0.4mg PO QHS  - f/u outpatient w/ urology    DVT PPX: SCD's; no chemoprophylaxis due to concern for GI bleed  GI PPX: pantoprazole 40mg PO QD  DIET: DASH/TLC  ACTIVITY: AAT  CODE STATUS: Full Code  DISPOSITION: From Home

## 2022-02-08 NOTE — PROGRESS NOTE ADULT - ASSESSMENT
# Acute on Chronic Microcytic Anemia  - no clear source of bleeding noted   -  microcytic anemia   - s/p 2 units of prbc in ed -  - CT Chest w/IV Contrast demonstrated  Bilateral centrilobular nodular opacities consistent with small  airways infection/inflammation, Pulmonary nodules measuring up to 9 mm. Intrahepatic and extrahepatic biliary ductal dilatation with mild  dilatation of the pancreatic duct. Gastric wall thickening likely due to underdistention  - CT Abdomen/Pelvis 2/3/22 demonstrated Cholelithiasis and moderate intrahepatic and CBD dilatation  GI recomended EGD and colonoscopy-- but patient apprehensive  on protonix  Dilated PD and CBD-- MRCP recommended-- but liver function is normal and patient not keen on it.    # Mild right hydronephrosis and proximal hydroureter  patient seen urology-- recommend renal bladder sonogram -- enlarged prostate with mild post void residual.    # SBO-- hx of abdominal surgery 10 yrs ago was in [facility] recently for SBO-- currently no evidence of it.-   -      # Mild  parkinson-on sinemet to continue- as per neurology  # HTN  - atenolol  can be restarted    # B12 def  c/w b12 1000mcg PO qdaily    # GERD  - c/w PPI         called patient's family-- spoke with daughter and patient can go home as there is no SBO. waiting to hear back again from her.   Dc home today-- spent more than 30mins.       # Acute on Chronic Microcytic Anemia  - no clear source of bleeding noted   -  microcytic anemia   - s/p 2 units of prbc in ed -  - CT Chest w/IV Contrast demonstrated  Bilateral centrilobular nodular opacities consistent with small  airways infection/inflammation, Pulmonary nodules measuring up to 9 mm. Intrahepatic and extrahepatic biliary ductal dilatation with mild  dilatation of the pancreatic duct. Gastric wall thickening likely due to underdistention  - CT Abdomen/Pelvis 2/3/22 demonstrated Cholelithiasis and moderate intrahepatic and CBD dilatation  GI recomended EGD and colonoscopy-- but patient apprehensive  on protonix  Dilated PD and CBD-- MRCP recommended-- but liver function is normal and patient not keen on it.    # Mild right hydronephrosis and proximal hydroureter  patient seen urology-- recommend renal bladder sonogram -- enlarged prostate with mild post void residual.    # SBO-- hx of abdominal surgery 10 yrs ago was in [facility] recently for SBO-- currently no evidence of it.-   -      # Mild  parkinson-on sinemet to continue- as per neurology  # HTN  - atenolol  can be restarted    # B12 def  c/w b12 1000mcg PO qdaily    # GERD  - c/w PPI         called patient's family-- spoke with daughter and patient can go home as there is no SBO.  Dc home today-- spent more than 30mins.    spoke with lina-- she wants to DC seroqeul and miralax.spent more than 30mins

## 2022-02-08 NOTE — DISCHARGE NOTE PROVIDER - HOSPITAL COURSE
This is an 86 year old M with a PMx of an unclear abdominal sx history (has had abdominal surgery in [facility] many years ago and 1 in [place] about 15 years ago as well, but unknown which hospital), SBO managed medically at [facility] 1/2022, HTN, Questionable history of parkinson's disease (patient takes sinemet at home), BPH, B12 deficiency, GERD, presents for a two week history of generalized weakness and dizziness.  The patient has endorsed having trouble ambulating due to his fatigue.  He also endorses dizziness, which is exacerbated by standing up (granddaughter endorses history of hypotension).  Patient denies any bloody bowel movements or any episodes of hematemesis during his presentation.  Patient has endorsed abdominal pain that is cramping and feels as if his insides are "twisting" and it occurs intermittently and is moderate in severity and endorses an episode of NBNB prior to admission.  Patient endorses losing 1-2lbs in the 12 months and endorses a good appetite.  His granddaughter who is a neurology resident at [place] ([phone]) endorses that the patient has a history of orthostatic hypotension.  He has been prescribed sinemet due to incoordination in his gait, describing his gait similar to penguin.  Granddaughter denies history of hematologic issues in the family.  She is also concerned of polypharmacy as patient is on an anti-psychotic, two benzo medications along with sinemet. Patient denies chest pain, sob, fever, rigors, exertional dyspnea or dyspnea at rest.     ICU Vital Signs Last 24 Hrs  T(C): 36.9 (05 Feb 2022 18:15), Max: 37.3 (05 Feb 2022 12:00)  T(F): 98.4 (05 Feb 2022 18:15), Max: 99.1 (05 Feb 2022 12:00)  HR: 72 (05 Feb 2022 18:15) (72 - 91)  BP: 117/58 (05 Feb 2022 18:15) (70/40 - 124/54)  BP(mean): --  ABP: --  ABP(mean): --  RR: 16 (05 Feb 2022 18:15) (16 - 18)  SpO2: 100% (05 Feb 2022 18:15) (100% - 100%)    In the ED:  CBC demonstrated a Hgb downtrend from 8.4 to 5.4, CMP demonstrates  no electrolyte abnormalities and no evidence of ALI or OLLIE, Iron studies significant for RICHARD but ferritin is pending, , BUN 38/SCr 1.1.  CT Chest w/IV Contrast demonstrated  Bilateral centrilobular nodular opacities consistent with small   airways infection/inflammation nPulmonary nodules measuring up to 9 mm.Intrahepatic and extrahepatic biliary ductal dilatation with mild  dilatation of the pancreatic duct. Gastric wall thickening likely due to underdistention, CT Abdomen/Pelvis 2/3/22 demonstrated Cholelithiasis and moderate intrahepatic and CBD dilatation. Further   evaluation with MRI/MRCP may be of benefit. Focal diminished attenuation of the left lower renal cortex. This may   reflect evidence of pyelonephritis in the appropriate clinical setting.  This may also reflect sequela of prior insult. Mild right hydronephrosis and proximal hydroureter. No definite  obstructing calculus is seen with mid and distal right ureter appearing normal in caliber.  Patient is s/p 2U PRBC     Repeat CBC demonstrated appropriate response in Hb (5.4->8.5->10.1). GI consulted and recommended EGD and colonoscopy. However, patient and family declined at this time and would like to follow up outpatient.     Hospital course c/b CTAP 2/3 demonstrating mild proximal right hydroureter and hydronephrosis. However, no hydronephrosis or hydroureter visualized on repeat CTAP 2/5. Enlarged prostate seen on both CT scans. RBUS demonstrated PVR <100cc. No acute  intervention at this time per urology. Patient to f/u outpatient w/ urology.     Admission CTAP demonstrated cholelithiasis and moderate intrahepatic biliary dilatation with extension into the CBD. Patient asymptomatic though, so no need for MRCP at this time.    Of note, patient w/ h/o Parkinson Disease. Managed outpatient w/ Sinemet  TID. Per granddaughter, patient has had visual hallucinations over the past several years and is increasingly agitated at night. Patient is also on klonopin and xanax as well but frequency and doses are unknown. Patient also takes quetiapine 25mg QD. Patient has had imaging of his brain in the past but granddaughter is unsure where. Per neurology team, patient has mild parkinsonian features on physical exam. Recommend f/u outpatient neurologist in [place] for further Sinemet management. Also recommended d/c Xanax given high morbidity in elderly patients. Home Klonipin held on admission as patient already on Xanax. This is an 86 year old M with a PMx of an unclear abdominal sx history (has had abdominal surgery in [facility] many years ago and 1 in [place] about 15 years ago as well, but unknown which hospital), SBO managed medically at [facility] 1/2022, HTN, Questionable history of parkinson's disease (patient takes sinemet at home), BPH, B12 deficiency, GERD, presents for a two week history of generalized weakness and dizziness.  The patient has endorsed having trouble ambulating due to his fatigue.  He also endorses dizziness, which is exacerbated by standing up (granddaughter endorses history of hypotension).  Patient denies any bloody bowel movements or any episodes of hematemesis during his presentation.  Patient has endorsed abdominal pain that is cramping and feels as if his insides are "twisting" and it occurs intermittently and is moderate in severity and endorses an episode of NBNB prior to admission.  Patient endorses losing 1-2lbs in the 12 months and endorses a good appetite.  His granddaughter who is a neurology resident at [place] ([phone]) endorses that the patient has a history of orthostatic hypotension.  He has been prescribed sinemet due to incoordination in his gait, describing his gait similar to penguin.  Granddaughter denies history of hematologic issues in the family.  She is also concerned of polypharmacy as patient is on an anti-psychotic, two benzo medications along with sinemet. Patient denies chest pain, sob, fever, rigors, exertional dyspnea or dyspnea at rest.     In the ED:  CBC demonstrated a Hgb downtrend from 8.4 to 5.4, CMP demonstrates  no electrolyte abnormalities and no evidence of ALI or OLLIE, Iron studies significant for RICHARD but ferritin is pending, , BUN 38/SCr 1.1.  CT Chest w/IV Contrast demonstrated  Bilateral centrilobular nodular opacities consistent with small   airways infection/inflammation nPulmonary nodules measuring up to 9 mm.Intrahepatic and extrahepatic biliary ductal dilatation with mild  dilatation of the pancreatic duct. Gastric wall thickening likely due to underdistention, CT Abdomen/Pelvis 2/3/22 demonstrated Cholelithiasis and moderate intrahepatic and CBD dilatation. Further   evaluation with MRI/MRCP may be of benefit. Focal diminished attenuation of the left lower renal cortex. This may   reflect evidence of pyelonephritis in the appropriate clinical setting.  This may also reflect sequela of prior insult. Mild right hydronephrosis and proximal hydroureter. No definite  obstructing calculus is seen with mid and distal right ureter appearing normal in caliber.  Patient is s/p 2U PRBC     Repeat CBC demonstrated appropriate response in Hb (5.4->8.5->10.1). GI consulted and recommended EGD and colonoscopy. However, patient and family declined at this time and would like to follow up outpatient.     Hospital course c/b CTAP 2/3 demonstrating mild proximal right hydroureter and hydronephrosis. However, no hydronephrosis or hydroureter visualized on repeat CTAP 2/5. Enlarged prostate seen on both CT scans. RBUS demonstrated PVR <100cc. No acute  intervention at this time per urology. Patient to f/u outpatient w/ urology.     Admission CTAP demonstrated cholelithiasis and moderate intrahepatic biliary dilatation with extension into the CBD. Patient asymptomatic though, so no need for MRCP at this time.    Of note, patient w/ h/o Parkinson Disease. Managed outpatient w/ Sinemet  TID. Per granddaughter, patient has had visual hallucinations over the past several years and is increasingly agitated at night. Patient is also on klonopin and xanax as well but frequency and doses are unknown. Patient also takes quetiapine 25mg QD. Patient has had imaging of his brain in the past but granddaughter is unsure where. Per neurology team, patient has mild parkinsonian features on physical exam. Recommend f/u outpatient neurologist in [place] for further Sinemet management. Also recommended d/c Xanax given high morbidity in elderly patients. Home Klonipin held on admission as patient already on Xanax.    On 2/9/2022 patient and family agreed to proceed with EGD/Colonoscopy with GI and the patient was transferred to Dr. Silva's service. On 2/10/2022 patient underwent EGD which showed normal findings and Colonoscopy which revealed Grade III internal and external hemorrhoids without active bleeding. Following the procedures patient's diet was advanced. Patient is tolerating his diet, ambulating, voiding, passing gas, having bowel movements, and his pain is well controlled. Patient was spoken to using Cantonese  (ID#[phone]) and instructed to follow up with Dr. Silva within 1-2 weeks. Patient is stable and ready for discharge. This is an 86 year old M with a PMx of an unclear abdominal sx history (has had abdominal surgery in [facility] many years ago and 1 in [place] about 15 years ago as well, but unknown which hospital), SBO managed medically at [facility] 1/2022, HTN, Questionable history of parkinson's disease (patient takes sinemet at home), BPH, B12 deficiency, GERD, presents for a two week history of generalized weakness and dizziness.  The patient has endorsed having trouble ambulating due to his fatigue.  He also endorses dizziness, which is exacerbated by standing up (granddaughter endorses history of hypotension).  Patient denies any bloody bowel movements or any episodes of hematemesis during his presentation.  Patient has endorsed abdominal pain that is cramping and feels as if his insides are "twisting" and it occurs intermittently and is moderate in severity and endorses an episode of NBNB prior to admission.  Patient endorses losing 1-2lbs in the 12 months and endorses a good appetite.  His granddaughter who is a neurology resident at [place] ([phone]) endorses that the patient has a history of orthostatic hypotension.  He has been prescribed sinemet due to incoordination in his gait, describing his gait similar to penguin.  Granddaughter denies history of hematologic issues in the family.  She is also concerned of polypharmacy as patient is on an anti-psychotic, two benzo medications along with sinemet. Patient denies chest pain, sob, fever, rigors, exertional dyspnea or dyspnea at rest.     In the ED:  CBC demonstrated a Hgb downtrend from 8.4 to 5.4, CMP demonstrates  no electrolyte abnormalities and no evidence of ALI or OLLIE, Iron studies significant for RICHARD but ferritin is pending, , BUN 38/SCr 1.1.  CT Chest w/IV Contrast demonstrated  Bilateral centrilobular nodular opacities consistent with small   airways infection/inflammation nPulmonary nodules measuring up to 9 mm.Intrahepatic and extrahepatic biliary ductal dilatation with mild  dilatation of the pancreatic duct. Gastric wall thickening likely due to underdistention, CT Abdomen/Pelvis 2/3/22 demonstrated Cholelithiasis and moderate intrahepatic and CBD dilatation. Further   evaluation with MRI/MRCP may be of benefit. Focal diminished attenuation of the left lower renal cortex. This may   reflect evidence of pyelonephritis in the appropriate clinical setting.  This may also reflect sequela of prior insult. Mild right hydronephrosis and proximal hydroureter. No definite  obstructing calculus is seen with mid and distal right ureter appearing normal in caliber.  Patient is s/p 2U PRBC     Repeat CBC demonstrated appropriate response in Hb (5.4->8.5->10.1). GI consulted and recommended EGD and colonoscopy. However, patient and family declined at this time and would like to follow up outpatient.     Hospital course c/b CTAP 2/3 demonstrating mild proximal right hydroureter and hydronephrosis. However, no hydronephrosis or hydroureter visualized on repeat CTAP 2/5. Enlarged prostate seen on both CT scans. RBUS demonstrated PVR <100cc. No acute  intervention at this time per urology. Patient to f/u outpatient w/ urology.     Admission CTAP demonstrated cholelithiasis and moderate intrahepatic biliary dilatation with extension into the CBD. Patient asymptomatic though, so no need for MRCP at this time.    Of note, patient w/ h/o Parkinson Disease. Managed outpatient w/ Sinemet  TID. Per granddaughter, patient has had visual hallucinations over the past several years and is increasingly agitated at night. Patient is also on klonopin and xanax as well but frequency and doses are unknown. Patient also takes quetiapine 25mg QD. Patient has had imaging of his brain in the past but granddaughter is unsure where. Per neurology team, patient has mild parkinsonian features on physical exam. Recommend f/u outpatient neurologist in [place] for further Sinemet management. Also recommended d/c Xanax given high morbidity in elderly patients. Home Klonipin held on admission as patient already on Xanax.    On 2/9/2022 patient and family agreed to proceed with EGD/Colonoscopy with GI and the patient was transferred to Dr. Silva's service. On 2/10/2022 patient underwent EGD which showed normal findings and Colonoscopy which revealed Grade III internal and external hemorrhoids without active bleeding. Following the procedures patient's diet was advanced. Patient is tolerating his diet, ambulating, voiding, passing gas, having bowel movements, and his pain is well controlled. Patient was spoken to using Cantonese  Patricia (ID#[id number]) and instructed to follow up with Dr. Silva within 1-2 weeks. Patient is stable and ready for discharge.

## 2022-02-09 LAB
ANION GAP SERPL CALC-SCNC: 10 MMOL/L — SIGNIFICANT CHANGE UP (ref 7–14)
ANION GAP SERPL CALC-SCNC: 14 MMOL/L — SIGNIFICANT CHANGE UP (ref 7–14)
APTT BLD: 41.7 SEC — HIGH (ref 27–39.2)
BASOPHILS # BLD AUTO: 0.08 K/UL — SIGNIFICANT CHANGE UP (ref 0–0.2)
BASOPHILS # BLD AUTO: 0.08 K/UL — SIGNIFICANT CHANGE UP (ref 0–0.2)
BASOPHILS NFR BLD AUTO: 1.4 % — HIGH (ref 0–1)
BASOPHILS NFR BLD AUTO: 1.4 % — HIGH (ref 0–1)
BUN SERPL-MCNC: 13 MG/DL — SIGNIFICANT CHANGE UP (ref 10–20)
BUN SERPL-MCNC: 14 MG/DL — SIGNIFICANT CHANGE UP (ref 10–20)
CALCIUM SERPL-MCNC: 8.3 MG/DL — LOW (ref 8.5–10.1)
CALCIUM SERPL-MCNC: 8.3 MG/DL — LOW (ref 8.5–10.1)
CHLORIDE SERPL-SCNC: 102 MMOL/L — SIGNIFICANT CHANGE UP (ref 98–110)
CHLORIDE SERPL-SCNC: 98 MMOL/L — SIGNIFICANT CHANGE UP (ref 98–110)
CO2 SERPL-SCNC: 22 MMOL/L — SIGNIFICANT CHANGE UP (ref 17–32)
CO2 SERPL-SCNC: 23 MMOL/L — SIGNIFICANT CHANGE UP (ref 17–32)
CREAT SERPL-MCNC: 0.9 MG/DL — SIGNIFICANT CHANGE UP (ref 0.7–1.5)
CREAT SERPL-MCNC: 0.9 MG/DL — SIGNIFICANT CHANGE UP (ref 0.7–1.5)
EOSINOPHIL # BLD AUTO: 0.19 K/UL — SIGNIFICANT CHANGE UP (ref 0–0.7)
EOSINOPHIL # BLD AUTO: 0.2 K/UL — SIGNIFICANT CHANGE UP (ref 0–0.7)
EOSINOPHIL NFR BLD AUTO: 3.4 % — SIGNIFICANT CHANGE UP (ref 0–8)
EOSINOPHIL NFR BLD AUTO: 3.6 % — SIGNIFICANT CHANGE UP (ref 0–8)
GLUCOSE SERPL-MCNC: 125 MG/DL — HIGH (ref 70–99)
GLUCOSE SERPL-MCNC: 99 MG/DL — SIGNIFICANT CHANGE UP (ref 70–99)
HCT VFR BLD CALC: 30 % — LOW (ref 42–52)
HCT VFR BLD CALC: 31.1 % — LOW (ref 42–52)
HGB BLD-MCNC: 10.1 G/DL — LOW (ref 14–18)
HGB BLD-MCNC: 9.9 G/DL — LOW (ref 14–18)
IMM GRANULOCYTES NFR BLD AUTO: 10.7 % — HIGH (ref 0.1–0.3)
IMM GRANULOCYTES NFR BLD AUTO: 9.7 % — HIGH (ref 0.1–0.3)
INR BLD: 1.08 RATIO — SIGNIFICANT CHANGE UP (ref 0.65–1.3)
LYMPHOCYTES # BLD AUTO: 0.66 K/UL — LOW (ref 1.2–3.4)
LYMPHOCYTES # BLD AUTO: 0.79 K/UL — LOW (ref 1.2–3.4)
LYMPHOCYTES # BLD AUTO: 12 % — LOW (ref 20.5–51.1)
LYMPHOCYTES # BLD AUTO: 14 % — LOW (ref 20.5–51.1)
MAGNESIUM SERPL-MCNC: 2.1 MG/DL — SIGNIFICANT CHANGE UP (ref 1.8–2.4)
MAGNESIUM SERPL-MCNC: 2.2 MG/DL — SIGNIFICANT CHANGE UP (ref 1.8–2.4)
MCHC RBC-ENTMCNC: 24 PG — LOW (ref 27–31)
MCHC RBC-ENTMCNC: 24.3 PG — LOW (ref 27–31)
MCHC RBC-ENTMCNC: 32.5 G/DL — SIGNIFICANT CHANGE UP (ref 32–37)
MCHC RBC-ENTMCNC: 33 G/DL — SIGNIFICANT CHANGE UP (ref 32–37)
MCV RBC AUTO: 73.7 FL — LOW (ref 80–94)
MCV RBC AUTO: 74 FL — LOW (ref 80–94)
MONOCYTES # BLD AUTO: 0.45 K/UL — SIGNIFICANT CHANGE UP (ref 0.1–0.6)
MONOCYTES # BLD AUTO: 0.47 K/UL — SIGNIFICANT CHANGE UP (ref 0.1–0.6)
MONOCYTES NFR BLD AUTO: 8.2 % — SIGNIFICANT CHANGE UP (ref 1.7–9.3)
MONOCYTES NFR BLD AUTO: 8.3 % — SIGNIFICANT CHANGE UP (ref 1.7–9.3)
NEUTROPHILS # BLD AUTO: 3.54 K/UL — SIGNIFICANT CHANGE UP (ref 1.4–6.5)
NEUTROPHILS # BLD AUTO: 3.58 K/UL — SIGNIFICANT CHANGE UP (ref 1.4–6.5)
NEUTROPHILS NFR BLD AUTO: 63.2 % — SIGNIFICANT CHANGE UP (ref 42.2–75.2)
NEUTROPHILS NFR BLD AUTO: 64.1 % — SIGNIFICANT CHANGE UP (ref 42.2–75.2)
NRBC # BLD: 0 /100 WBCS — SIGNIFICANT CHANGE UP (ref 0–0)
NRBC # BLD: 0 /100 WBCS — SIGNIFICANT CHANGE UP (ref 0–0)
PHOSPHATE SERPL-MCNC: 3.2 MG/DL — SIGNIFICANT CHANGE UP (ref 2.1–4.9)
PHOSPHATE SERPL-MCNC: 3.3 MG/DL — SIGNIFICANT CHANGE UP (ref 2.1–4.9)
PLATELET # BLD AUTO: 222 K/UL — SIGNIFICANT CHANGE UP (ref 130–400)
PLATELET # BLD AUTO: 222 K/UL — SIGNIFICANT CHANGE UP (ref 130–400)
POTASSIUM SERPL-MCNC: 4.5 MMOL/L — SIGNIFICANT CHANGE UP (ref 3.5–5)
POTASSIUM SERPL-MCNC: 4.8 MMOL/L — SIGNIFICANT CHANGE UP (ref 3.5–5)
POTASSIUM SERPL-SCNC: 4.5 MMOL/L — SIGNIFICANT CHANGE UP (ref 3.5–5)
POTASSIUM SERPL-SCNC: 4.8 MMOL/L — SIGNIFICANT CHANGE UP (ref 3.5–5)
PROTHROM AB SERPL-ACNC: 12.4 SEC — SIGNIFICANT CHANGE UP (ref 9.95–12.87)
RBC # BLD: 4.07 M/UL — LOW (ref 4.7–6.1)
RBC # BLD: 4.2 M/UL — LOW (ref 4.7–6.1)
RBC # FLD: 22.9 % — HIGH (ref 11.5–14.5)
RBC # FLD: 23.1 % — HIGH (ref 11.5–14.5)
SARS-COV-2 RNA SPEC QL NAA+PROBE: SIGNIFICANT CHANGE UP
SODIUM SERPL-SCNC: 134 MMOL/L — LOW (ref 135–146)
SODIUM SERPL-SCNC: 135 MMOL/L — SIGNIFICANT CHANGE UP (ref 135–146)
WBC # BLD: 5.52 K/UL — SIGNIFICANT CHANGE UP (ref 4.8–10.8)
WBC # BLD: 5.66 K/UL — SIGNIFICANT CHANGE UP (ref 4.8–10.8)
WBC # FLD AUTO: 5.52 K/UL — SIGNIFICANT CHANGE UP (ref 4.8–10.8)
WBC # FLD AUTO: 5.66 K/UL — SIGNIFICANT CHANGE UP (ref 4.8–10.8)

## 2022-02-09 PROCEDURE — 99232 SBSQ HOSP IP/OBS MODERATE 35: CPT

## 2022-02-09 PROCEDURE — 99233 SBSQ HOSP IP/OBS HIGH 50: CPT

## 2022-02-09 RX ORDER — SODIUM CHLORIDE 9 MG/ML
1000 INJECTION, SOLUTION INTRAVENOUS
Refills: 0 | Status: DISCONTINUED | OUTPATIENT
Start: 2022-02-09 | End: 2022-02-11

## 2022-02-09 RX ORDER — ENOXAPARIN SODIUM 100 MG/ML
40 INJECTION SUBCUTANEOUS DAILY
Refills: 0 | Status: DISCONTINUED | OUTPATIENT
Start: 2022-02-09 | End: 2022-02-11

## 2022-02-09 RX ORDER — SOD SULF/SODIUM/NAHCO3/KCL/PEG
4000 SOLUTION, RECONSTITUTED, ORAL ORAL ONCE
Refills: 0 | Status: COMPLETED | OUTPATIENT
Start: 2022-02-09 | End: 2022-02-09

## 2022-02-09 RX ADMIN — ENOXAPARIN SODIUM 40 MILLIGRAM(S): 100 INJECTION SUBCUTANEOUS at 11:10

## 2022-02-09 RX ADMIN — CARBIDOPA AND LEVODOPA 1 TABLET(S): 25; 100 TABLET ORAL at 21:41

## 2022-02-09 RX ADMIN — PANTOPRAZOLE SODIUM 40 MILLIGRAM(S): 20 TABLET, DELAYED RELEASE ORAL at 08:23

## 2022-02-09 RX ADMIN — FINASTERIDE 5 MILLIGRAM(S): 5 TABLET, FILM COATED ORAL at 11:09

## 2022-02-09 RX ADMIN — SODIUM CHLORIDE 90 MILLILITER(S): 9 INJECTION, SOLUTION INTRAVENOUS at 11:08

## 2022-02-09 RX ADMIN — QUETIAPINE FUMARATE 25 MILLIGRAM(S): 200 TABLET, FILM COATED ORAL at 11:09

## 2022-02-09 RX ADMIN — ATENOLOL 25 MILLIGRAM(S): 25 TABLET ORAL at 05:37

## 2022-02-09 RX ADMIN — Medication 20 MILLIGRAM(S): at 21:41

## 2022-02-09 RX ADMIN — CARBIDOPA AND LEVODOPA 1 TABLET(S): 25; 100 TABLET ORAL at 05:37

## 2022-02-09 RX ADMIN — Medication 4000 MILLILITER(S): at 15:20

## 2022-02-09 RX ADMIN — CARBIDOPA AND LEVODOPA 1 TABLET(S): 25; 100 TABLET ORAL at 13:19

## 2022-02-09 RX ADMIN — TAMSULOSIN HYDROCHLORIDE 0.4 MILLIGRAM(S): 0.4 CAPSULE ORAL at 21:41

## 2022-02-09 NOTE — CHART NOTE - NSCHARTNOTEFT_GEN_A_CORE
- will plan for EGD/colonoscopy tomorrow 2/10   - Please correct electrolytes (Target Na = 135-145 | Mg = 1.7-2.2 | K = 3.5-5)  - Please correct INR to <1.5  - Please target Hb  >8  - Please ensure there is one set of CBC BMP and Coagulation profile day prior to procedure and all labs to be optimized the day prior to procedure  - D/C AM Labs unless clinically required  - NPO after midnight    Prep Instructions  - 1 Day Before Procedure - Please ensure patient is on CLEAR LIQUID DIET and ORDER bowel prep - 1 Gallon of Golytely, dulcolax 20mg at night     - Night Before Procedure - Please keep patient NPO after midnight    - Day of Procedure - Please call [phone] at 7am to discuss quality of bowel movements and possible additional prep instructions. (Goal for bowel movement: clear watery stools with minimial blood or brown stools)

## 2022-02-09 NOTE — CHART NOTE - NSCHARTNOTEFT_GEN_A_CORE
************************************************  Timothy Brito MD (PGY-1)  Spectra: [phone]  ************************************************    Transfer from: [facility] F4-4B    Transfer to: Surgery  Accepting Physician: Dr. Silva    HPI :    This is an 86 year old M with a PMx of an unclear abdominal sx history (has had abdominal surgery in [facility] many years ago and 1 in [place] about 15 years ago as well, but unknown which hospital), SBO managed medically at [facility] 1/2022, HTN, Questionable history of parkinson's disease (patient takes sinemet at home), BPH, B12 deficiency, GERD, presents for a two week history of generalized weakness and dizziness.  The patient has endorsed having trouble ambulating due to his fatigue.  He also endorses dizziness, which is exacerbated by standing up (granddaughter endorses history of hypotension).  Patient denies any bloody bowel movements or any episodes of hematemesis during his presentation.  Patient has endorsed abdominal pain that is cramping and feels as if his insides are "twisting" and it occurs intermittently and is moderate in severity and endorses an episode of NBNB prior to admission.  Patient endorses losing 1-2lbs in the 12 months and endorses a good appetite.  His granddaughter who is a neurology resident at [place] ([phone]) endorses that the patient has a history of orthostatic hypotension.  He has been prescribed sinemet due to incoordination in his gait, describing his gait similar to penguin.  Granddaughter denies history of hematologic issues in the family.  She is also concerned of polypharmacy as patient is on an anti-psychotic, two benzo medications along with sinemet. Patient denies chest pain, sob, fever, rigors, exertional dyspnea or dyspnea at rest.     ICU Vital Signs Last 24 Hrs  T(C): 36.9 (05 Feb 2022 18:15), Max: 37.3 (05 Feb 2022 12:00)  T(F): 98.4 (05 Feb 2022 18:15), Max: 99.1 (05 Feb 2022 12:00)  HR: 72 (05 Feb 2022 18:15) (72 - 91)  BP: 117/58 (05 Feb 2022 18:15) (70/40 - 124/54)  BP(mean): --  ABP: --  ABP(mean): --  RR: 16 (05 Feb 2022 18:15) (16 - 18)  SpO2: 100% (05 Feb 2022 18:15) (100% - 100%)    In the ED:  CBC demonstrated a Hgb downtrend from 8.4 to 5.4, CMP demonstrates  no electrolyte abnormalities and no evidence of ALI or OLLIE, Iron studies significant for RICHARD but ferritin is pending, , BUN 38/SCr 1.1.  CT Chest w/IV Contrast demonstrated  Bilateral centrilobular nodular opacities consistent with small   airways infection/inflammation nPulmonary nodules measuring up to 9 mm.Intrahepatic and extrahepatic biliary ductal dilatation with mild  dilatation of the pancreatic duct. Gastric wall thickening likely due to underdistention, CT Abdomen/Pelvis 2/3/22 demonstrated Cholelithiasis and moderate intrahepatic and CBD dilatation. Further   evaluation with MRI/MRCP may be of benefit. Focal diminished attenuation of the left lower renal cortex. This may   reflect evidence of pyelonephritis in the appropriate clinical setting.  This may also reflect sequela of prior insult. Mild right hydronephrosis and proximal hydroureter. No definite  obstructing calculus is seen with mid and distal right ureter appearing normal in caliber.  Patient is s/p 2U PRBC     ASSESSMENT & PLAN:   86 year old M with a PMx of an unclear abdominal sx history (has had abdominal surgery in [facility] many years ago and 1 in [place] about 15 years ago as well, but unknown which hospital), SBO managed medically at [facility] 1/2022, HTN, Questionable history of parkinson's disease (patient takes sinemet at home), BPH, B12 deficiency, GERD, presents for a two week history of generalized weakness and dizziness.    #Acute on Chronic Microcytic Anemia  Initially p/w weakness and dizziness. Admission labs showed Hb 5.4 (previously Hb 8.4). Iron studies showed: Iron 107, TIBC 175, Transferrin 121, Ferritin 249. Iron studies may not be reliable though as unclear if drawn before or after pRBC transfusion. GI recommending EGD and colonoscopy, but pt's family initially declined. s/p 2u pRBC this admission.  - Trend Hb via daily CBC; transfuse if Hb <7  - Maintain active T+S  - f/u Hb electrophoresis to evaluate for thalassemia   - f/u GI for EGD and colonoscopy    #Dilated Common Bile Duct  Admission CTAP demonstrated cholelithiasis and moderate intrahepatic biliary dilatation with extension into the CBD. GI recommending MRCP, but patient currently w/o evidence of abdominal pain.    #Parkinson Disease  Managed outpatient w/ Sinemet  TID. Per granddaughter, patient has had visual hallucinations over the past several years and is increasingly agitated at night. Patient previously on Klonopin and xanax. Klonopin d/c'd on admission to avoid doubling benzodiazepines. Xanax later d'cd per neurology due to associated increased morbidity in elderly patients. Patient also takes quetiapine 25mg QD. Patient has had imaging of his brain in the past but granddaughter is unsure where. Per neurology team, patient has mild parkinsonian features on physical exam.  - c/w Sinemet () 1 tab PO TID per neurology  - c/w quetiapine 25mg PO QHS as 2nd gen anti-psychotics have low risk for worsening parkinsonian symptoms  - f/u outpatient neurologist in [place] for further Sinemet management  - avoid dopamine blocker agents    #Hypertension  Managed outpatient w/ atenolol 25mg QD. Currently held in setting of acute anemia and orthostatic hypotension.  - continue to hold atenolol    #GERD  Managed outpatient w/ dexlansoprazole 60mg QD  - c/w pantoprazole 40mg PO QD (therapeutic interchange for dexlansoprazole)    #Benign Prostate Hyperplasia  Manged outpatient w/ dutasteride 0.5mg QD and tamsulosin .4mg QD. CTAP 2/3 demonstrated mild proximal right hydroureter and hydronephrosis. However, no hydronephrosis or hydroureter visualized on repeat CTAP 2/5. Enlarged prostate seen on both CT scans. RBUS demonstrated PVR <100c. No acute  intervention at this time per urology.  - c/w finasteride 5mg PO QD (therapeutic interchange for dutasteride)  - c/w tamsulosin 0.4mg PO QHS  - f/u outpatient w/ urology    DVT PPX: SCD's; no chemoprophylaxis due to concern for GI bleed  GI PPX: pantoprazole 40mg PO QD  DIET: DASH/TLC  ACTIVITY: AAT  CODE STATUS: Full Code  DISPOSITION: From Home    PENDING: transfer to surgery service for further management; f/u GI for EGD and colonoscopy ************************************************  Timothy Brito MD (PGY-1)  Spectra: [phone]  ************************************************    Transfer from: [facility] F4-4B    Transfer to: Surgery  Accepting Physician: Dr. Silva    HPI :    This is an 86 year old M with a PMx of an unclear abdominal sx history (has had abdominal surgery in [facility] many years ago and 1 in [place] about 15 years ago as well, but unknown which hospital), SBO managed medically at [facility] 1/2022, HTN, Questionable history of parkinson's disease (patient takes sinemet at home), BPH, B12 deficiency, GERD, presents for a two week history of generalized weakness and dizziness.  The patient has endorsed having trouble ambulating due to his fatigue.  He also endorses dizziness, which is exacerbated by standing up (granddaughter endorses history of hypotension).  Patient denies any bloody bowel movements or any episodes of hematemesis during his presentation.  Patient has endorsed abdominal pain that is cramping and feels as if his insides are "twisting" and it occurs intermittently and is moderate in severity and endorses an episode of NBNB prior to admission.  Patient endorses losing 1-2lbs in the 12 months and endorses a good appetite.  His granddaughter who is a neurology resident at [place] [phone]) endorses that the patient has a history of orthostatic hypotension.  He has been prescribed sinemet due to incoordination in his gait, describing his gait similar to penguin.  Granddaughter denies history of hematologic issues in the family.  She is also concerned of polypharmacy as patient is on an anti-psychotic, two benzo medications along with sinemet. Patient denies chest pain, sob, fever, rigors, exertional dyspnea or dyspnea at rest.     ICU Vital Signs Last 24 Hrs  T(C): 36.9 (05 Feb 2022 18:15), Max: 37.3 (05 Feb 2022 12:00)  T(F): 98.4 (05 Feb 2022 18:15), Max: 99.1 (05 Feb 2022 12:00)  HR: 72 (05 Feb 2022 18:15) (72 - 91)  BP: 117/58 (05 Feb 2022 18:15) (70/40 - 124/54)  BP(mean): --  ABP: --  ABP(mean): --  RR: 16 (05 Feb 2022 18:15) (16 - 18)  SpO2: 100% (05 Feb 2022 18:15) (100% - 100%)    In the ED:  CBC demonstrated a Hgb downtrend from 8.4 to 5.4, CMP demonstrates  no electrolyte abnormalities and no evidence of ALI or OLLIE, Iron studies significant for RICHARD but ferritin is pending, , BUN 38/SCr 1.1.  CT Chest w/IV Contrast demonstrated  Bilateral centrilobular nodular opacities consistent with small   airways infection/inflammation nPulmonary nodules measuring up to 9 mm.Intrahepatic and extrahepatic biliary ductal dilatation with mild  dilatation of the pancreatic duct. Gastric wall thickening likely due to underdistention, CT Abdomen/Pelvis 2/3/22 demonstrated Cholelithiasis and moderate intrahepatic and CBD dilatation. Further   evaluation with MRI/MRCP may be of benefit. Focal diminished attenuation of the left lower renal cortex. This may   reflect evidence of pyelonephritis in the appropriate clinical setting.  This may also reflect sequela of prior insult. Mild right hydronephrosis and proximal hydroureter. No definite  obstructing calculus is seen with mid and distal right ureter appearing normal in caliber.  Patient is s/p 2U PRBC     ASSESSMENT & PLAN:   86 year old M with a PMx of an unclear abdominal sx history (has had abdominal surgery in [facility] many years ago and 1 in [place] about 15 years ago as well, but unknown which hospital), SBO managed medically at [facility] 1/2022, HTN, Questionable history of parkinson's disease (patient takes sinemet at home), BPH, B12 deficiency, GERD, presents for a two week history of generalized weakness and dizziness.    #Acute on Chronic Microcytic Anemia  Initially p/w weakness and dizziness. Admission labs showed Hb 5.4 (previously Hb 8.4). Iron studies showed: Iron 107, TIBC 175, Transferrin 121, Ferritin 249. Iron studies may not be reliable though as unclear if drawn before or after pRBC transfusion. GI recommending EGD and colonoscopy, but pt's family initially declined. s/p 2u pRBC this admission.  - Trend Hb via daily CBC; transfuse if Hb <7  - Maintain active T+S  - f/u Hb electrophoresis to evaluate for thalassemia   - f/u GI for EGD and colonoscopy    #Dilated Common Bile Duct  Admission CTAP demonstrated cholelithiasis and moderate intrahepatic biliary dilatation with extension into the CBD. GI recommending MRCP, but patient currently w/o evidence of abdominal pain.    #Parkinson Disease  Managed outpatient w/ Sinemet  TID. Per granddaughter, patient has had visual hallucinations over the past several years and is increasingly agitated at night. Patient previously on Klonopin and xanax. Klonopin d/c'd on admission to avoid doubling benzodiazepines. Xanax later d'cd per neurology due to associated increased morbidity in elderly patients. Patient also takes quetiapine 25mg QD. Patient has had imaging of his brain in the past but granddaughter is unsure where. Per neurology team, patient has mild parkinsonian features on physical exam.  - c/w Sinemet () 1 tab PO TID per neurology  - c/w quetiapine 25mg PO QHS as 2nd gen anti-psychotics have low risk for worsening parkinsonian symptoms  - f/u outpatient neurologist in [place] for further Sinemet management  - avoid dopamine blocker agents    #Hypertension  Managed outpatient w/ atenolol 25mg QD. Currently held in setting of acute anemia and orthostatic hypotension.  - continue to hold atenolol    #GERD  Managed outpatient w/ dexlansoprazole 60mg QD  - c/w pantoprazole 40mg PO QD (therapeutic interchange for dexlansoprazole)    #Benign Prostate Hyperplasia  Manged outpatient w/ dutasteride 0.5mg QD and tamsulosin .4mg QD. CTAP 2/3 demonstrated mild proximal right hydroureter and hydronephrosis. However, no hydronephrosis or hydroureter visualized on repeat CTAP 2/5. Enlarged prostate seen on both CT scans. RBUS demonstrated PVR <100c. No acute  intervention at this time per urology.  - c/w finasteride 5mg PO QD (therapeutic interchange for dutasteride)  - c/w tamsulosin 0.4mg PO QHS  - f/u outpatient w/ urology    DVT PPX: SCD's; no chemoprophylaxis due to concern for GI bleed  GI PPX: pantoprazole 40mg PO QD  DIET: DASH/TLC  ACTIVITY: AAT  CODE STATUS: Full Code  DISPOSITION: From Home    PENDING: transfer to surgery service for further management; f/u GI for EGD and colonoscopy      Attending Attestation:  Patient was seen & examined independently. At least 10 systems were reviewed in ROS. All systems reviewed  are within normal limits. Latest vital signs and labs were reviewed today. Case was discussed with house staff in morning rounds for assessment and plan.  Patient is being transferred to surgery service for further management .

## 2022-02-09 NOTE — PROGRESS NOTE ADULT - ASSESSMENT
ASSESSMENT:  86/M with unknown gastric surgery, SBO managed medically at [facility] 1/2022, HTN, parkinson's disease, BPH, B12 deficiency, GERD, presents for a two week history of generalized weakness, nausea vomiting.    PLAN:  - NPO and IVF  - F/U GI for EGD and Colonoscopy  - Trend CBC  - Monitor Bm and Flatus  - Ambulate   - IS  - DVT AND GI Prophylaxis    Spectra [phone]

## 2022-02-10 ENCOUNTER — TRANSCRIPTION ENCOUNTER (OUTPATIENT)
Age: 87
End: 2022-02-10

## 2022-02-10 ENCOUNTER — RESULT REVIEW (OUTPATIENT)
Age: 87
End: 2022-02-10

## 2022-02-10 LAB
ANION GAP SERPL CALC-SCNC: 13 MMOL/L — SIGNIFICANT CHANGE UP (ref 7–14)
BASOPHILS # BLD AUTO: 0.1 K/UL — SIGNIFICANT CHANGE UP (ref 0–0.2)
BASOPHILS NFR BLD AUTO: 1 % — SIGNIFICANT CHANGE UP (ref 0–1)
BUN SERPL-MCNC: 17 MG/DL — SIGNIFICANT CHANGE UP (ref 10–20)
CALCIUM SERPL-MCNC: 8.8 MG/DL — SIGNIFICANT CHANGE UP (ref 8.5–10.1)
CHLORIDE SERPL-SCNC: 101 MMOL/L — SIGNIFICANT CHANGE UP (ref 98–110)
CO2 SERPL-SCNC: 21 MMOL/L — SIGNIFICANT CHANGE UP (ref 17–32)
CREAT SERPL-MCNC: 1.1 MG/DL — SIGNIFICANT CHANGE UP (ref 0.7–1.5)
EOSINOPHIL # BLD AUTO: 0.08 K/UL — SIGNIFICANT CHANGE UP (ref 0–0.7)
EOSINOPHIL NFR BLD AUTO: 0.8 % — SIGNIFICANT CHANGE UP (ref 0–8)
GLUCOSE SERPL-MCNC: 105 MG/DL — HIGH (ref 70–99)
HCT VFR BLD CALC: 36.5 % — LOW (ref 42–52)
HEMOGLOBIN INTERPRETATION: SIGNIFICANT CHANGE UP
HGB A MFR BLD: 97.6 % — SIGNIFICANT CHANGE UP (ref 95.8–98)
HGB A2 MFR BLD: 2.4 % — SIGNIFICANT CHANGE UP (ref 2–3.2)
HGB BLD-MCNC: 12 G/DL — LOW (ref 14–18)
IMM GRANULOCYTES NFR BLD AUTO: 6.5 % — HIGH (ref 0.1–0.3)
LYMPHOCYTES # BLD AUTO: 1.45 K/UL — SIGNIFICANT CHANGE UP (ref 1.2–3.4)
LYMPHOCYTES # BLD AUTO: 14.7 % — LOW (ref 20.5–51.1)
MAGNESIUM SERPL-MCNC: 2.1 MG/DL — SIGNIFICANT CHANGE UP (ref 1.8–2.4)
MCHC RBC-ENTMCNC: 24.2 PG — LOW (ref 27–31)
MCHC RBC-ENTMCNC: 32.9 G/DL — SIGNIFICANT CHANGE UP (ref 32–37)
MCV RBC AUTO: 73.6 FL — LOW (ref 80–94)
MONOCYTES # BLD AUTO: 0.76 K/UL — HIGH (ref 0.1–0.6)
MONOCYTES NFR BLD AUTO: 7.7 % — SIGNIFICANT CHANGE UP (ref 1.7–9.3)
NEUTROPHILS # BLD AUTO: 6.86 K/UL — HIGH (ref 1.4–6.5)
NEUTROPHILS NFR BLD AUTO: 69.3 % — SIGNIFICANT CHANGE UP (ref 42.2–75.2)
NRBC # BLD: 0 /100 WBCS — SIGNIFICANT CHANGE UP (ref 0–0)
PHOSPHATE SERPL-MCNC: 3.7 MG/DL — SIGNIFICANT CHANGE UP (ref 2.1–4.9)
PLATELET # BLD AUTO: 262 K/UL — SIGNIFICANT CHANGE UP (ref 130–400)
POTASSIUM SERPL-MCNC: 4.9 MMOL/L — SIGNIFICANT CHANGE UP (ref 3.5–5)
POTASSIUM SERPL-SCNC: 4.9 MMOL/L — SIGNIFICANT CHANGE UP (ref 3.5–5)
RBC # BLD: 4.96 M/UL — SIGNIFICANT CHANGE UP (ref 4.7–6.1)
RBC # FLD: 22.6 % — HIGH (ref 11.5–14.5)
SODIUM SERPL-SCNC: 135 MMOL/L — SIGNIFICANT CHANGE UP (ref 135–146)
WBC # BLD: 9.89 K/UL — SIGNIFICANT CHANGE UP (ref 4.8–10.8)
WBC # FLD AUTO: 9.89 K/UL — SIGNIFICANT CHANGE UP (ref 4.8–10.8)

## 2022-02-10 PROCEDURE — 43239 EGD BIOPSY SINGLE/MULTIPLE: CPT | Mod: XS

## 2022-02-10 PROCEDURE — 99231 SBSQ HOSP IP/OBS SF/LOW 25: CPT

## 2022-02-10 PROCEDURE — 88305 TISSUE EXAM BY PATHOLOGIST: CPT | Mod: 26

## 2022-02-10 PROCEDURE — 88312 SPECIAL STAINS GROUP 1: CPT | Mod: 26

## 2022-02-10 PROCEDURE — 45380 COLONOSCOPY AND BIOPSY: CPT

## 2022-02-10 RX ADMIN — Medication 20 MILLIGRAM(S): at 21:41

## 2022-02-10 RX ADMIN — CARBIDOPA AND LEVODOPA 1 TABLET(S): 25; 100 TABLET ORAL at 21:42

## 2022-02-10 RX ADMIN — ENOXAPARIN SODIUM 40 MILLIGRAM(S): 100 INJECTION SUBCUTANEOUS at 11:12

## 2022-02-10 RX ADMIN — TAMSULOSIN HYDROCHLORIDE 0.4 MILLIGRAM(S): 0.4 CAPSULE ORAL at 21:41

## 2022-02-10 NOTE — PROGRESS NOTE ADULT - ASSESSMENT
ASSESSMENT:  86/M with unknown gastric surgery, SBO managed medically at [facility] 1/2022, HTN, parkinson's disease, BPH, B12 deficiency, GERD, presents for a two week history of generalized weakness, nausea vomiting.    PLAN:  - 80% golytely finished  - Will receive 2 enemas prior to Colonoscopy and EGD today  - DVT PPX  - GI PPX  - Encourage ambulation  - Daily  conversations  - Monitor for bowel function  - Monitor CBC    Spectra: [phone] ASSESSMENT:  86/M with unknown gastric surgery, SBO managed medically at [facility] 1/2022, HTN, parkinson's disease, BPH, B12 deficiency, GERD, presents for a two week history of generalized weakness, nausea vomiting.    PLAN:  - 80% golytely finished  - Will receive 2 enemas prior to Colonoscopy and EGD today  - DVT PPX  - GI PPX  - Encourage ambulation  - Daily  conversations  - Monitor for bowel function  - Monitor CBC  - Discussed plans with family today    : [id number]    Spectra: [phone]

## 2022-02-10 NOTE — PRE-ANESTHESIA EVALUATION ADULT - NSANTHOSAYNRD_GEN_A_CORE
No. ROSALIA screening performed.  STOP BANG Legend: 0-2 = LOW Risk; 3-4 = INTERMEDIATE Risk; 5-8 = HIGH Risk

## 2022-02-10 NOTE — ANESTHESIA FOLLOW-UP NOTE - NSEVALATIONFT_GEN_ALL_CORE
No anesthesia complications  Awake and alert  Airway: Patent  Pain: O  BP: 138/66  HR: 74  RR: 16  Temp: 98  SpO2: 97  PONV: O  Hydration;: Satisfactory

## 2022-02-11 VITALS
TEMPERATURE: 99 F | RESPIRATION RATE: 18 BRPM | SYSTOLIC BLOOD PRESSURE: 154 MMHG | DIASTOLIC BLOOD PRESSURE: 73 MMHG | OXYGEN SATURATION: 96 % | HEART RATE: 69 BPM

## 2022-02-11 PROCEDURE — 99233 SBSQ HOSP IP/OBS HIGH 50: CPT

## 2022-02-11 PROCEDURE — 99238 HOSP IP/OBS DSCHRG MGMT 30/<: CPT

## 2022-02-11 RX ORDER — QUETIAPINE FUMARATE 200 MG/1
1 TABLET, FILM COATED ORAL
Qty: 0 | Refills: 0 | DISCHARGE
Start: 2022-02-11

## 2022-02-11 RX ORDER — SODIUM CHLORIDE 9 MG/ML
500 INJECTION, SOLUTION INTRAVENOUS
Refills: 0 | Status: DISCONTINUED | OUTPATIENT
Start: 2022-02-11 | End: 2022-02-11

## 2022-02-11 RX ADMIN — ATENOLOL 25 MILLIGRAM(S): 25 TABLET ORAL at 05:43

## 2022-02-11 RX ADMIN — Medication 650 MILLIGRAM(S): at 13:02

## 2022-02-11 RX ADMIN — FINASTERIDE 5 MILLIGRAM(S): 5 TABLET, FILM COATED ORAL at 13:17

## 2022-02-11 RX ADMIN — QUETIAPINE FUMARATE 25 MILLIGRAM(S): 200 TABLET, FILM COATED ORAL at 13:18

## 2022-02-11 RX ADMIN — SODIUM CHLORIDE 500 MILLILITER(S): 9 INJECTION, SOLUTION INTRAVENOUS at 01:41

## 2022-02-11 RX ADMIN — CARBIDOPA AND LEVODOPA 1 TABLET(S): 25; 100 TABLET ORAL at 05:43

## 2022-02-11 RX ADMIN — CARBIDOPA AND LEVODOPA 1 TABLET(S): 25; 100 TABLET ORAL at 13:18

## 2022-02-11 RX ADMIN — PANTOPRAZOLE SODIUM 40 MILLIGRAM(S): 20 TABLET, DELAYED RELEASE ORAL at 08:20

## 2022-02-11 RX ADMIN — ENOXAPARIN SODIUM 40 MILLIGRAM(S): 100 INJECTION SUBCUTANEOUS at 12:17

## 2022-02-11 NOTE — PROGRESS NOTE ADULT - ATTENDING COMMENTS
I edited the note
Patient is an 86M with PMH of HTN, Parkinson's disease, BPH, GERD, unknown gastric surgery many years ago and recent admission for SBO managed medically at [facility] 1/2022 presents with weakness nausea and vomiting.  Recently seen in [facility] ER.  Found to be anemic with Hgb of 5.4    Patient seen and examined.  Appears comfortable    Abdomen - soft, non distended, non tender.  Well healed laparotomy scar    Vitals labs and images reviewed    PLAN:  - f/u GI regarding EGD colonoscopy given anemia and history of gastric surgery  - CLD   - trend hgb
Patient is an 86M with PMH of HTN, Parkinson's disease, BPH, GERD, unknown gastric surgery many years ago and recent admission for SBO managed medically at [facility] 1/2022 presents with weakness nausea and vomiting.  Recently seen in [facility] ER.  Found to be anemic with Hgb of 5.4    Patient seen and examined.  Appears comfortable    Abdomen - soft, non distended, non tender.  Well healed laparotomy scar    Vitals labs and images reviewed    PLAN:  - f/u GI for EGD colonoscopy  today  - NPO   - trend hgb
Patient is an 86M with PMH of HTN, Parkinson's disease, BPH, GERD, unknown gastric surgery many years ago and recent admission for SBO managed medically at [facility] 1/2022 presents with weakness nausea and vomiting.  Recently seen in [facility] ER.  Found to be anemic with Hgb of 5.4    Patient seen and examined.  Appears comfortable. Tolerating a diet    Abdomen - soft, non distended, non tender.  Well healed laparotomy scar    Vitals labs and images reviewed    EGD colonoscopy noted    PLAN:  - regular diet  - DC home

## 2022-02-11 NOTE — PROGRESS NOTE ADULT - ASSESSMENT
ASSESSMENT:  86/M with unknown gastric surgery, SBO managed medically at [facility] 1/2022, HTN, parkinson's disease, BPH, B12 deficiency, GERD, presents for a two week history of generalized weakness, nausea vomiting now POD 1 s/p EGD and colonoscopy revealing grade III internal and external hemorrhoids with no active bleeding.    PLAN:  - Monitor Creatinine  - Regular diet  - Monitor bowel function, having gas and bowel movements  - F/U with GI in [facility] outpatient  - Likely discharge home today if tolerating regular diet  - DVT PPX  - GI PPX  - Encourage ambulation  - Daily  conversations  - Monitor CBC  - Discussed plans with family today    :     Spectra: [phone]

## 2022-02-11 NOTE — PROGRESS NOTE ADULT - SUBJECTIVE AND OBJECTIVE BOX
GENERAL SURGERY PROGRESS NOTE    Patient: LUCIA OVALLES , 86y ([birth year])Male   MRN: [id number]  Location: [address]  Visit: 02-05-22 Inpatient  Date: 02-11-22 @ 02:32    Hospital Day #: 7  Post-Op Day #:    Procedure/Dx/Injuries: anemia s/p EGD and colonoscopy    Events of past 24 hours: EGD with normal findings, colonoscopy showing grade III internal and external hemorrhoids without active bleeding. Patient with increase in Cr from 0.9 to 1.1 s/p golytely was given 500 cc bolus LR. Patient given regular diet after scopes and tolerated it without nausea/vomiting. Family was spoken to regarding findings. Having gas and bowel movements, denies pain, bloating.    PAST MEDICAL & SURGICAL HISTORY:  HTN (hypertension)    HLD (hyperlipidemia)    BPH (benign prostatic hyperplasia)    SBO (small bowel obstruction)    History of appendectomy        Vitals:   T(F): 99.1 (02-11-22 @ 00:00), Max: 99.1 (02-11-22 @ 00:00)  HR: 79 (02-11-22 @ 00:00)  BP: 133/68 (02-11-22 @ 00:00)  RR: 18 (02-11-22 @ 00:00)  SpO2: 100% (02-10-22 @ 16:41)      Diet, Regular  Diet, NPO after Midnight:      NPO Start Date: 09-Feb-2022,   NPO Start Time: 23:59      Fluids: lactated ringers.: Solution, 500 milliLiter(s) infuse at 500 mL/Hr      I & O's:    02-09-22 @ 07:01  -  02-10-22 @ 07:00  --------------------------------------------------------  IN:    Oral Fluid: 240 mL  Total IN: 240 mL    OUT:  Total OUT: 0 mL    Total NET: 240 mL        PHYSICAL EXAM:  General: NAD, AAOx3, calm and cooperative  HEENT: NCAT, EVER, EOMI, Trachea ML, Neck supple  Cardiac: RRR S1, S2, no Murmurs, rubs or gallops  Respiratory: CTAB, normal respiratory effort, breath sounds equal BL, no wheeze, rhonchi or crackles  Abdomen: Soft, non-distended, non-tender    MEDICATIONS  (STANDING):  ATENolol  Tablet 25 milliGRAM(s) Oral daily  bisacodyl 20 milliGRAM(s) Oral at bedtime  carbidopa/levodopa  10/100 1 Tablet(s) Oral three times a day  enoxaparin Injectable 40 milliGRAM(s) SubCutaneous daily  finasteride 5 milliGRAM(s) Oral daily  lactated ringers. 1000 milliLiter(s) (90 mL/Hr) IV Continuous <Continuous>  lactated ringers. 500 milliLiter(s) (500 mL/Hr) IV Continuous <Continuous>  pantoprazole    Tablet 40 milliGRAM(s) Oral before breakfast  QUEtiapine 25 milliGRAM(s) Oral daily  tamsulosin 0.4 milliGRAM(s) Oral at bedtime    MEDICATIONS  (PRN):  acetaminophen     Tablet .. 650 milliGRAM(s) Oral every 6 hours PRN Moderate Pain (4 - 6), Severe Pain (7 - 10)      DVT PROPHYLAXIS: enoxaparin Injectable 40 milliGRAM(s) SubCutaneous daily    GI PROPHYLAXIS: pantoprazole    Tablet 40 milliGRAM(s) Oral before breakfast    ANTICOAGULATION:   ANTIBIOTICS:            LAB/STUDIES:  Labs:  CAPILLARY BLOOD GLUCOSE                              12.0   9.89  )-----------( 262      ( 10 Feb 2022 20:00 )             36.5       Auto Neutrophil %: 69.3 % (02-10-22 @ 20:00)  Auto Immature Granulocyte %: 6.5 % (02-10-22 @ 20:00)    02-10    135  |  101  |  17  ----------------------------<  105<H>  4.9   |  21  |  1.1      Calcium, Total Serum: 8.8 mg/dL (02-10-22 @ 20:00)      LFTs:         Coags:     12.40  ----< 1.08    ( 09 Feb 2022 20:00 )     41.7            Serum Pro-Brain Natriuretic Peptide: 449 pg/mL (02-05-22 @ 12:03)                  IMAGING:    < from: EGD-Colonoscopy (02.10.22 @ 13:30) >    History of Present Illness:   H&P was previously reviewed.       Administered Medications: As per Anesthesiology Record     EGD Indications: Anemia: 285.9 - D64.9  Colonoscopy Indications: Anemia: 285.9 - D64.9    General Procedure:   The procedure, indications, preparation and potential complications were  explained to the patient, who indicated understanding and signed the  corresponding consent forms. MAC was administered. Continuous pulse oximetry and  blood pressure monitoring were used throughout the procedure. Supplemental  oxygen was used.      EGD                         EGD Procedure:   Patient was placed in left lateral decubitus position. The endoscope was  introduced through mouth and advanced under direct visualization until second  part of the duodenum reached. Patient tolerance to the procedure was good. The  procedure was not difficult.    EGD Findings:   Esophagus Normal esophagus.   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 Endoscopic findings were consistent with normal post-operative  anatomy. Multiple cold forceps biopsies were performed for histology.   Duodenum Mucosa Normal mucosa was noted in the first part of the duodenum and  second part of the duodenum. Multiple cold forceps biopsies were performed for  histology.     EGD Impressions:    Normal esophagus.    Gastricprevious surgery. (Biopsy).    Normal mucosa in the first part of the duodenum and second part of the  duodenum. (Biopsy).       EGD Limitations/Complications:   There were no apparent limitations or complications  Colonoscopy                                             Colonoscopy Procedure:   This is a  average risk patient  undergoing diagnostic colonoscopy. Prior  colonoscopy was performed never ago.  The quality of preparation was Good.  Patient was placed in left lateral decubitus position.The colonoscope was  introduced through rectum and advanced under direct visualization until cecum  reached. The appendiceal orifice and the ileo-cecal valve were identified. The  colonoscope was retroflexed within the rectum. Careful visualization was  performed as the instrument was withdrawn. Patient tolerance to the procedure  was excellent. The procedure was not difficult. Digital exam was normal with the  following finding(s): hemorrhoids.    Colonoscopy Findings:   Protruding lesions Medium non-bleeding grade/stage III internal and external  hemorrhoids were noted.   Additional findings - and The colon was otherwise unremarkable.     Colonoscopy Impressions:    Grade/Stage III internal and external hemorrhoids.    - and The colon was otherwise unremarkable.     Colonoscopy Limitations/Complications:   There were no apparent limitations or complications    Plan: Resume diet   Await pathology results  follow up in [facility] at next available appointment  protonix 40 qdaily  outpatient video capsule endoscopy         Attending Statement: I was present and participated during the entire procedure,  including non-matos portions.       Rae Aguayo MD    Version 1, Electronically signed on 2/10/2022 4:26:32 PM by MD Elis Leon MD    < end of copied text >  
GENERAL SURGERY PROGRESS NOTE    Patient: LUCIA OVALLES , 86y ([birth year])Male   MRN: [id number]  Location: [address]  Visit: 02-05-22 Inpatient  Date: 02-10-22 @ 10:01    Hospital Day #: 6  Post-Op Day #:    Procedure/Dx/Injuries: anemia    Events of past 24 hours: Patient is set to go for EGD and colonoscopy this afternoon. Continues to have some abdominal pain, having gas and bowel movements.    PAST MEDICAL & SURGICAL HISTORY:  HTN (hypertension)    HLD (hyperlipidemia)    BPH (benign prostatic hyperplasia)    SBO (small bowel obstruction)    History of appendectomy        Vitals:   T(F): 97.9 (02-10-22 @ 08:18), Max: 98.4 (02-09-22 @ 15:47)  HR: 73 (02-10-22 @ 08:18)  BP: 159/69 (02-10-22 @ 08:18)  RR: 19 (02-10-22 @ 08:18)  SpO2: 97% (02-09-22 @ 15:47)      Diet, NPO after Midnight:      NPO Start Date: 09-Feb-2022,   NPO Start Time: 23:59  Diet, Clear Liquid      Fluids:     I & O's:    02-09-22 @ 07:01  -  02-10-22 @ 07:00  --------------------------------------------------------  IN:    Oral Fluid: 240 mL  Total IN: 240 mL    OUT:  Total OUT: 0 mL    Total NET: 240 mL          PHYSICAL EXAM:  General: NAD, AAOx3, calm and cooperative  HEENT: NCAT, EVER, EOMI, Trachea ML, Neck supple  Cardiac: RRR S1, S2, no Murmurs, rubs or gallops  Respiratory: CTAB, normal respiratory effort, breath sounds equal BL, no wheeze, rhonchi or crackles  Abdomen: Soft, non-distended, minimally tender diffusely    MEDICATIONS  (STANDING):  ATENolol  Tablet 25 milliGRAM(s) Oral daily  bisacodyl 20 milliGRAM(s) Oral at bedtime  carbidopa/levodopa  10/100 1 Tablet(s) Oral three times a day  enoxaparin Injectable 40 milliGRAM(s) SubCutaneous daily  finasteride 5 milliGRAM(s) Oral daily  lactated ringers. 1000 milliLiter(s) (90 mL/Hr) IV Continuous <Continuous>  pantoprazole    Tablet 40 milliGRAM(s) Oral before breakfast  QUEtiapine 25 milliGRAM(s) Oral daily  tamsulosin 0.4 milliGRAM(s) Oral at bedtime    MEDICATIONS  (PRN):  acetaminophen     Tablet .. 650 milliGRAM(s) Oral every 6 hours PRN Moderate Pain (4 - 6), Severe Pain (7 - 10)      DVT PROPHYLAXIS: enoxaparin Injectable 40 milliGRAM(s) SubCutaneous daily    GI PROPHYLAXIS: pantoprazole    Tablet 40 milliGRAM(s) Oral before breakfast    ANTICOAGULATION:   ANTIBIOTICS:            LAB/STUDIES:  Labs:  CAPILLARY BLOOD GLUCOSE                              9.9    5.66  )-----------( 222      ( 09 Feb 2022 20:00 )             30.0       Auto Neutrophil %: 63.2 % (02-09-22 @ 20:00)  Auto Immature Granulocyte %: 9.7 % (02-09-22 @ 20:00)  Auto Neutrophil %: 64.1 % (02-09-22 @ 12:29)  Auto Immature Granulocyte %: 10.7 % (02-09-22 @ 12:29)    02-09    135  |  102  |  13  ----------------------------<  99  4.8   |  23  |  0.9      Calcium, Total Serum: 8.3 mg/dL (02-09-22 @ 20:00)      LFTs:         Coags:     12.40  ----< 1.08    ( 09 Feb 2022 20:00 )     41.7            Serum Pro-Brain Natriuretic Peptide: 449 pg/mL (02-05-22 @ 12:03)                    
LUCIA OVALLES 86y Male  MRN#: [id number]   CODE STATUS: Full code    Hospital Day: 1d    Pt is currently admitted with the primary diagnosis of Anemia    SUBJECTIVE    Subjective complaints   Patient doing well, received 2 units of blood yesterday  Present Today:   - Max:  No [x  ], Yes [   ] : Indication:     - Type of IV Access:       .. CVC/Piccline:  No [x  ], Yes [   ] : Indication:       .. Midline: No [x  ], Yes [   ] : Indication:                                             ----------------------------------------------------------  OBJECTIVE  PAST MEDICAL & SURGICAL HISTORY  HTN (hypertension)    HLD (hyperlipidemia)    BPH (benign prostatic hyperplasia)    SBO (small bowel obstruction)    History of appendectomy                                              -----------------------------------------------------------  ALLERGIES:  No Known Allergies                                            ------------------------------------------------------------    HOME MEDICATIONS  Home Medications:  ALPRAZOLAM 0.5MG TAB: 1 tab(s) orally once a day (05 Feb 2022 19:39)  ATENOLOL 25MG TAB:  (05 Feb 2022 19:36)  CARBIDOPA/LEVODOPA 10-100MG TAB:  (05 Feb 2022 19:36)  DEXILANT 60MG DR CAP:  (05 Feb 2022 19:36)  DUTASTERIDE 0.5MG CAP: 1 cap(s) orally once a day (05 Feb 2022 19:36)  QUETIAPINE FUMARATE 25MG TAB: 1 tab(s) orally once a day (05 Feb 2022 19:38)  TAMSULOSIN HYDROCHLORIDE 0.4MG CAP:  (05 Feb 2022 19:36)                           MEDICATIONS:  STANDING MEDICATIONS  ALPRAZolam 0.5 milliGRAM(s) Oral daily  ATENolol  Tablet 25 milliGRAM(s) Oral daily  carbidopa/levodopa  10/100 1 Tablet(s) Oral three times a day  finasteride 5 milliGRAM(s) Oral daily  pantoprazole    Tablet 40 milliGRAM(s) Oral before breakfast  QUEtiapine 25 milliGRAM(s) Oral daily  tamsulosin 0.4 milliGRAM(s) Oral at bedtime    PRN MEDICATIONS                                            ------------------------------------------------------------  VITAL SIGNS: Last 24 Hours  T(C): 35.7 (06 Feb 2022 08:10), Max: 36.9 (05 Feb 2022 18:15)  T(F): 96.2 (06 Feb 2022 08:10), Max: 98.4 (05 Feb 2022 18:15)  HR: 80 (06 Feb 2022 08:10) (72 - 81)  BP: 121/58 (06 Feb 2022 08:10) (105/52 - 168/75)  BP(mean): --  RR: 18 (06 Feb 2022 08:10) (16 - 18)  SpO2: 99% (06 Feb 2022 00:06) (99% - 100%)      02-06-22 @ 07:01  -  02-06-22 @ 13:18  --------------------------------------------------------  IN: 0 mL / OUT: 600 mL / NET: -600 mL                                             --------------------------------------------------------------  LABS:                        9.0    5.53  )-----------( 171      ( 06 Feb 2022 04:30 )             27.2     02-06    136  |  103  |  22<H>  ----------------------------<  142<H>  4.4   |  23  |  1.0    Ca    8.0<L>      06 Feb 2022 04:30  Phos  3.0     02-06  Mg     2.1     02-06    TPro  5.2<L>  /  Alb  3.2<L>  /  TBili  0.2  /  DBili  x   /  AST  18  /  ALT  <5  /  AlkPhos  41  02-06    PT/INR - ( 05 Feb 2022 12:03 )   PT: 11.30 sec;   INR: 0.98 ratio         PTT - ( 05 Feb 2022 12:03 )  PTT:33.0 sec              CARDIAC MARKERS ( 05 Feb 2022 12:03 )  x     / <0.01 ng/mL / x     / x     / x                                                  -------------------------------------------------------------  RADIOLOGY:                                            --------------------------------------------------------------    PHYSICAL EXAM:  General: alert oriented  HEENT: non remarkable  LUNGS: clear air sounds  HEART: normal s1s2  ABDOMEN: soft non tender  EXT: no edema                                               --------------------------------------------------------------    ASSESSMENT & PLAN  86 year old M with a PMx of an unclear abdominal sx history (has had abdominal surgery in [facility] many years ago and 1 in [place] about 15 years ago as well, but unknown which hospital), SBO managed medically at [facility] 1/2022, HTN, Questionable history of parkinson's disease (patient takes sinemet at home), BPH, B12 deficiency, GERD, presents for a two week history of generalized weakness and dizziness.    # Acute on Chronic Microcytic Anemia  GIB not suspected as patient had a negative SHMUEL   CBC demonstrated a Hgb downtrend from 8.4 to 5.4, CMP demonstrates  no electrolyte abnormalities and no evidence of ALI or OLLIE, Iron studies significant for RICHARD but ferritin is pending, , BUN 38/SCr 1.1.    Iron 107, TIBC 175, Transferrin 121, Ferritin pending  CT Chest w/IV Contrast demonstrated  Bilateral centrilobular nodular opacities consistent with small   airways infection/inflammation, Pulmonary nodules measuring up to 9 mm.Intrahepatic and extrahepatic biliary ductal dilatation with mild  dilatation of the pancreatic duct. Gastric wall thickening likely due to underdistention, CT Abdomen/Pelvis 2/3/22 demonstrated Cholelithiasis and moderate intrahepatic and CBD dilatation Mild right hydronephrosis and proximal hydroureter  -f/u w/Hb electrophoresis to r/i possible thalassemia   -Keep Active T+S  -starting Pantoprazole 40mg IV qdaily  -GI consulted: egd and colono, patient is still thinking about it and wants to discuss with the family  - Repeat Hb after units is 9 today    #Questionable history of parkinson/jose body dementia  Patient is on sinemet  TID as granddaughter has noted that patient has had visual hallucinations over the past several years and is increasingly agitated at night  Patient is also on klonopin and xanax as well but frequency and doses are unknown   Patient also takes quetiapine 25mg PO qdaily   Patient has had imaging of his brain in the past but granddaughter is unsure where.   Will c/w xanax .5mg PO qdaily and quetiapine 25mg PO qdaily   neurology consulted    #HTN  would hold off on giving atenolol 25mg PO qdaily in setting of acute anemia and orthostatic hypotension    #B12 def  c/w b12 1000mcg PO qdaily    #GERD  c/w PPI    # BPH  - Patient with mild hydronephrosis and hydroureter  - Uro consulted: Renal US with PVR      #DVT ppx: SCD  #GI: PPI  #AAT  Diet: DASH  Dispo: Acute  Full Code                                                                                                                ----------------------------------------------------  # DVT prophylaxis     # GI prophylaxis     # Diet     # Activity Score (AM-PAC)    # Code status     # Disposition                                                                              --------------------------------------------------------    # Silvestre     
Patient is a 86y old  Male who presents with a chief complaint of Acute on Chronic Anemia, Abdominal Pain (2022 13:18)      Patient seen and examined at bedside.    ALLERGIES:  No Known Allergies    MEDICATIONS:  ALPRAZolam 0.5 milliGRAM(s) Oral daily  ATENolol  Tablet 25 milliGRAM(s) Oral daily  carbidopa/levodopa  10/100 1 Tablet(s) Oral three times a day  finasteride 5 milliGRAM(s) Oral daily  pantoprazole    Tablet 40 milliGRAM(s) Oral before breakfast  QUEtiapine 25 milliGRAM(s) Oral daily  tamsulosin 0.4 milliGRAM(s) Oral at bedtime    Vital Signs Last 24 Hrs  T(F): 96.2 (2022 08:10), Max: 98.4 (2022 18:15)  HR: 80 (2022 08:10) (72 - 81)  BP: 121/58 (2022 08:10) (105/52 - 168/75)  RR: 18 (2022 08:10) (16 - 18)  SpO2: 99% (2022 00:06) (99% - 100%)  I&O's Summary    2022 07:01  -  2022 14:30  --------------------------------------------------------  IN: 0 mL / OUT: 1000 mL / NET: -1000 mL        PHYSICAL EXAM:  General: NAD, A/O x 3  ENT: MMM  Neck: Supple, No JVD  Lungs: Clear to auscultation bilaterally  Cardio: RRR, S1/S2, 2/6 systolic murmur   Abdomen: Soft, Nontender, Nondistended; Bowel sounds present  Extremities: No cyanosis, No edema    LABS:                        9.0    5.53  )-----------( 171      ( 2022 04:30 )             27.2     02-06    136  |  103  |  22  ----------------------------<  142  4.4   |  23  |  1.0    Ca    8.0      2022 04:30  Phos  3.0     02-  Mg     2.1     -    TPro  5.2  /  Alb  3.2  /  TBili  0.2  /  DBili  x   /  AST  18  /  ALT  <5  /  AlkPhos  41  -    eGFR if Non African American: 68 mL/min/1.73M2 (22 @ 04:30)  eGFR if African American: 79 mL/min/1.73M2 (22 @ 04:30)    PT/INR - ( 2022 12:03 )   PT: 11.30 sec;   INR: 0.98 ratio         PTT - ( 2022 12:03 )  PTT:33.0 sec  Lactate, Blood: 1.7 mmol/L ( @ 16:36)        - Chol -- LDL -- HDL -- Trig 136 mg/dL                  Urinalysis Basic - ( 2022 19:00 )    Color: Light Yellow / Appearance: Clear / S.016 / pH: x  Gluc: x / Ketone: Negative  / Bili: Negative / Urobili: <2 mg/dL   Blood: x / Protein: Negative / Nitrite: Negative   Leuk Esterase: Negative / RBC: x / WBC x   Sq Epi: x / Non Sq Epi: x / Bacteria: x        COVID-19 PCR: NotDetec (22 @ 12:03)      RADIOLOGY & ADDITIONAL TESTS:    Care Discussed with Consultants/Other Providers: 
SURGERY PROGRESS NOTE    Patient: LUCIA OVALLES , 86y ([birth year])Male   MRN: [id number]  Location: [address]  Visit: 02-05-22 Inpatient  Date: 02-09-22 @ 09:28    Hospital Day #: 5    Procedure/Dx/Injuries: Chronic anemia    Events of past 24 hours: No acute events, Pt was transferred to surgery for further workup.    PAST MEDICAL & SURGICAL HISTORY:  HTN (hypertension)    HLD (hyperlipidemia)    BPH (benign prostatic hyperplasia)    SBO (small bowel obstruction)    History of appendectomy        Vitals:   T(F): 97.8 (02-09-22 @ 07:24), Max: 98.6 (02-08-22 @ 15:45)  HR: 65 (02-09-22 @ 07:24)  BP: 151/76 (02-09-22 @ 07:24)  RR: 18 (02-09-22 @ 07:24)  SpO2: 98% (02-09-22 @ 07:24)      Diet, Clear Liquid      Fluids:     I & O's:    02-08-22 @ 07:01  -  02-09-22 @ 07:00  --------------------------------------------------------  IN:    Oral Fluid: 240 mL  Total IN: 240 mL    OUT:    Voided (mL): 900 mL  Total OUT: 900 mL    Total NET: -660 mL    PHYSICAL EXAM:  General: NAD, AAOx3  HEENT: NCAT, EVER  Cardiac: RRR S1, S2  Respiratory: CTAB, normal respiratory effort  Abdomen: Soft, mildly, distended, non-tender, no rebound, no guarding  Musculoskeletal: ROM intact, compartments soft    MEDICATIONS  (STANDING):  ATENolol  Tablet 25 milliGRAM(s) Oral daily  carbidopa/levodopa  10/100 1 Tablet(s) Oral three times a day  enoxaparin Injectable 40 milliGRAM(s) SubCutaneous daily  finasteride 5 milliGRAM(s) Oral daily  pantoprazole    Tablet 40 milliGRAM(s) Oral before breakfast  QUEtiapine 25 milliGRAM(s) Oral daily  tamsulosin 0.4 milliGRAM(s) Oral at bedtime    MEDICATIONS  (PRN):  acetaminophen     Tablet .. 650 milliGRAM(s) Oral every 6 hours PRN Moderate Pain (4 - 6), Severe Pain (7 - 10)      DVT PROPHYLAXIS: enoxaparin Injectable 40 milliGRAM(s) SubCutaneous daily    GI PROPHYLAXIS: pantoprazole    Tablet 40 milliGRAM(s) Oral before breakfast                          10.1   6.07  )-----------( 194      ( 08 Feb 2022 11:00 )             31.0       Auto Neutrophil %: 59.7 % (02-08-22 @ 11:00)  Auto Immature Granulocyte %: 9.1 % (02-08-22 @ 11:00)    Serum Pro-Brain Natriuretic Peptide: 449 pg/mL (02-05-22 @ 12:03)      
Gastroenterology progress note:     Patient is a 86y old  Male who presents with a chief complaint of Acute on Chronic Anemia, Abdominal Pain (11 Feb 2022 02:32)       Admitted on: 02-05-22    We are following the patient for: anemia       Interval History:    No acute events overnight.   - Diet - toelrating  - last BM - 1 day ago  - Abdominal pain - none      PAST MEDICAL & SURGICAL HISTORY:  HTN (hypertension)    HLD (hyperlipidemia)    BPH (benign prostatic hyperplasia)    SBO (small bowel obstruction)    History of appendectomy        MEDICATIONS  (STANDING):  ATENolol  Tablet 25 milliGRAM(s) Oral daily  bisacodyl 20 milliGRAM(s) Oral at bedtime  carbidopa/levodopa  10/100 1 Tablet(s) Oral three times a day  enoxaparin Injectable 40 milliGRAM(s) SubCutaneous daily  finasteride 5 milliGRAM(s) Oral daily  lactated ringers. 1000 milliLiter(s) (90 mL/Hr) IV Continuous <Continuous>  lactated ringers. 500 milliLiter(s) (500 mL/Hr) IV Continuous <Continuous>  pantoprazole    Tablet 40 milliGRAM(s) Oral before breakfast  QUEtiapine 25 milliGRAM(s) Oral daily  tamsulosin 0.4 milliGRAM(s) Oral at bedtime    MEDICATIONS  (PRN):  acetaminophen     Tablet .. 650 milliGRAM(s) Oral every 6 hours PRN Moderate Pain (4 - 6), Severe Pain (7 - 10)      Allergies  No Known Allergies      Review of Systems:   Cardiovascular:  No Chest Pain, No Palpitations  Respiratory:  No Cough, No Dyspnea  Gastrointestinal:  As described in HPI  Skin:  No Skin Lesions, No Jaundice  Neuro:  No Syncope, No Dizziness    Physical Examination:  T(C): 37.1 (02-11-22 @ 07:52), Max: 37.3 (02-11-22 @ 00:00)  HR: 69 (02-11-22 @ 07:52) (66 - 85)  BP: 154/73 (02-11-22 @ 07:52) (118/61 - 192/104)  RR: 18 (02-11-22 @ 07:52) (17 - 18)  SpO2: 96% (02-11-22 @ 07:52) (96% - 100%)  Weight (kg): 50 (02-10-22 @ 15:31)      GENERAL: AAOx3, no acute distress.  HEAD:  Atraumatic, Normocephalic  EYES: conjunctiva and sclera clear  NECK: Supple, no JVD or thyromegaly  CHEST/LUNG: Clear to auscultation bilaterally; No wheeze, rhonchi, or rales  HEART: Regular rate and rhythm; normal S1, S2, No murmurs.  ABDOMEN: Soft, nontender, nondistended; Bowel sounds present  NEUROLOGY: No asterixis or tremor.   SKIN: Intact, no jaundice     Data:                        12.0   9.89  )-----------( 262      ( 10 Feb 2022 20:00 )             36.5     Hgb trend:  12.0  02-10-22 @ 20:00  9.9  02-09-22 @ 20:00  10.1  02-09-22 @ 12:29  10.1  02-08-22 @ 11:00        02-10    135  |  101  |  17  ----------------------------<  105<H>  4.9   |  21  |  1.1    Ca    8.8      10 Feb 2022 20:00  Phos  3.7     02-10  Mg     2.1     02-10      Liver panel trend:  TBili 0.2   /   AST 18   /   ALT <5   /   AlkP 41   /   Tptn 5.2   /   Alb 3.2    /   DBili --      02-06  TBili <0.2   /   AST 19   /   ALT 8   /   AlkP 39   /   Tptn 5.1   /   Alb 3.2    /   DBili --      02-05  TBili 0.2   /   AST 16   /   ALT 8   /   AlkP 41   /   Tptn 5.7   /   Alb 3.3    /   DBili --      02-03      PT/INR - ( 09 Feb 2022 20:00 )   PT: 12.40 sec;   INR: 1.08 ratio         PTT - ( 09 Feb 2022 20:00 )  PTT:41.7 sec       Radiology:      
************************************************  Timothy Brito MD (PGY-1)  Spectra: [phone]  ************************************************    SUBJECTIVE / OVERNIGHT EVENTS  Patient slept well overnight. No acute complaints this AM. Patient does not report fevers, chills, CP, SOB, or n/v/d    MEDICATIONS    ALPRAZolam   0.5 milliGRAM(s) Oral (02-07-22 @ 11:04)    ATENolol  Tablet   25 milliGRAM(s) Oral (02-07-22 @ 06:12)    carbidopa/levodopa  10/100   1 Tablet(s) Oral (02-07-22 @ 23:14)   1 Tablet(s) Oral (02-07-22 @ 13:54)   1 Tablet(s) Oral (02-07-22 @ 06:13)    finasteride   5 milliGRAM(s) Oral (02-07-22 @ 11:04)    pantoprazole    Tablet   40 milliGRAM(s) Oral (02-07-22 @ 06:13)    QUEtiapine   25 milliGRAM(s) Oral (02-07-22 @ 11:04)    tamsulosin   0.4 milliGRAM(s) Oral (02-07-22 @ 23:14)      VITALS /  EXAM    T(C): 36.1 (02-07-22 @ 07:59), Max: 36.1 (02-07-22 @ 07:59)  HR: 72 (02-07-22 @ 07:59) (72 - 72)  BP: 109/55 (02-07-22 @ 07:59) (109/55 - 121/56)  RR: 18 (02-07-22 @ 07:59) (18 - 18)  SpO2: --    GENERAL: NAD, well-developed  CHEST/LUNG: Clear to auscultation bilaterally; No wheezes, rales or rhonchi  HEART: Regular rate and rhythm; No murmurs, rubs, or gallops  ABDOMEN: Soft, Nontender, Nondistended; Bowel sounds present, no masses.  EXTREMITIES:  2+ Peripheral Pulses, No clubbing, cyanosis, or edema    I's & O's     02-06-22 @ 07:01  -  02-07-22 @ 07:00  --------------------------------------------------------  IN:  Total IN: 0 mL    OUT:    Voided (mL): 1850 mL  Total OUT: 1850 mL    Total NET: -1850 mL    LABS             8.9    4.66  )-----------( 182      ( 02-07-22 @ 07:40 )             26.8     138  |  103  |  18  -------------------------<  103   02-07-22 @ 07:40  4.0  |  26  |  0.8    Ca      8.2     02-07-22 @ 07:40  Phos   3.0     02-06-22 @ 04:30  Mg     2.1     02-06-22 @ 04:30    TPro  5.2  /  Alb  3.2  /  TBili  0.2  /  DBili  x   /  AST  18  /  ALT  <5  /  AlkPhos  41  /  GGT  x     02-06-22 @ 04:30    Troponin T, Serum: <0.01 ng/mL (02-05-22 @ 12:03)    Serum Pro-Brain Natriuretic Peptide: 449 pg/mL (02-05-22 @ 12:03)
************************************************  Timothy Brito MD (PGY-1)  Spectra: [phone]  ************************************************    SUBJECTIVE / OVERNIGHT EVENTS  Patient slept well overnight. No acute complaints this AM. Patient does not report fevers, chills, CP, SOB, or n/v/d    MEDICATIONS    acetaminophen     Tablet ..   650 milliGRAM(s) Oral (02-08-22 @ 15:41)    ALPRAZolam   0.5 milliGRAM(s) Oral (02-08-22 @ 11:06)    ATENolol  Tablet   25 milliGRAM(s) Oral (02-08-22 @ 05:46)    carbidopa/levodopa  10/100   1 Tablet(s) Oral (02-08-22 @ 16:36)   1 Tablet(s) Oral (02-08-22 @ 05:45)   1 Tablet(s) Oral (02-07-22 @ 23:14)    finasteride   5 milliGRAM(s) Oral (02-08-22 @ 11:06)    pantoprazole    Tablet   40 milliGRAM(s) Oral (02-08-22 @ 08:17)    QUEtiapine   25 milliGRAM(s) Oral (02-08-22 @ 11:06)    tamsulosin   0.4 milliGRAM(s) Oral (02-07-22 @ 23:14)    VITALS /  EXAM    T(C): 37 (02-08-22 @ 15:45), Max: 37 (02-08-22 @ 15:45)  HR: 70 (02-08-22 @ 15:45) (68 - 74)  BP: 104/64 (02-08-22 @ 15:45) (104/64 - 132/63)  RR: 18 (02-08-22 @ 15:45) (18 - 18)  SpO2: --    GENERAL: NAD, well-developed  CHEST/LUNG: Clear to auscultation bilaterally; No wheezes, rales or rhonchi  HEART: Regular rate and rhythm; No murmurs, rubs, or gallops  ABDOMEN: Soft, Nontender, Nondistended; Bowel sounds present, no masses.  EXTREMITIES:  2+ Peripheral Pulses, No clubbing, cyanosis, or edema    I's & O's     02-07-22 @ 07:01  -  02-08-22 @ 07:00  --------------------------------------------------------  IN:  Total IN: 0 mL    OUT:    Voided (mL): 200 mL  Total OUT: 200 mL    Total NET: -200 mL    LABS             10.1   6.07  )-----------( 194      ( 02-08-22 @ 11:00 )             31.0     138  |  103  |  18  -------------------------<  103   02-07-22 @ 07:40  4.0  |  26  |  0.8    Ca      8.2     02-07-22 @ 07:40    MICRO / IMAGING / CARDIOLOGY    Xray Kidney Ureter Bladder (02.08.22 @ 11:04)  1. Nonobstructive bowel gas pattern  2. Small amount of residual oral contrast seen within the descending colon and rectum  3. Surgical clips are seen in the left kenji-abdomen  4. Right iliac bone island noted  5. Stable visualized osseous structures    US Kidney and Bladder (02.07.22 @ 17:26)  1. Enlarged prostate gland with mild post void residual  2. Normal-appearing kidneys
SUBJECTIVE:    Patient is a 86y old Male who presents with a chief complaint of Acute on Chronic Anemia, Abdominal Pain (07 Feb 2022 13:31)    Currently admitted to medicine with the primary diagnosis of Severe anemia       Today is hospital day 2d.     PAST MEDICAL & SURGICAL HISTORY  HTN (hypertension)    HLD (hyperlipidemia)    BPH (benign prostatic hyperplasia)    SBO (small bowel obstruction)    History of appendectomy      ALLERGIES:  No Known Allergies    MEDICATIONS:  STANDING MEDICATIONS  ALPRAZolam 0.5 milliGRAM(s) Oral daily  ATENolol  Tablet 25 milliGRAM(s) Oral daily  carbidopa/levodopa  10/100 1 Tablet(s) Oral three times a day  finasteride 5 milliGRAM(s) Oral daily  pantoprazole    Tablet 40 milliGRAM(s) Oral before breakfast  QUEtiapine 25 milliGRAM(s) Oral daily  tamsulosin 0.4 milliGRAM(s) Oral at bedtime    PRN MEDICATIONS    VITALS:   T(F): 97  HR: 72  BP: 109/55  RR: 18  SpO2: --    LABS:                        8.9    4.66  )-----------( 182      ( 07 Feb 2022 07:40 )             26.8     02-07    138  |  103  |  18  ----------------------------<  103<H>  4.0   |  26  |  0.8    Ca    8.2<L>      07 Feb 2022 07:40  Phos  3.0     02-06  Mg     2.1     02-06    TPro  5.2<L>  /  Alb  3.2<L>  /  TBili  0.2  /  DBili  x   /  AST  18  /  ALT  <5  /  AlkPhos  41  02-06                  RADIOLOGY:    PHYSICAL EXAM:  GEN: No acute distress  LUNGS: Clear to auscultation bilaterally   HEART: S1/S2 present. RRR.   ABD/ GI: Soft, non-tender, non-distended. Bowel sounds present  EXT: NC/NC/NE/2+PP/NAVA  NEURO: AAOX3    
SUBJECTIVE:    Patient is a 86y old Male who presents with a chief complaint of Acute on Chronic Anemia, Abdominal Pain (07 Feb 2022 18:40)    Currently admitted to medicine with the primary diagnosis of Severe anemia       Today is hospital day 3d.     PAST MEDICAL & SURGICAL HISTORY  HTN (hypertension)    HLD (hyperlipidemia)    BPH (benign prostatic hyperplasia)    SBO (small bowel obstruction)    History of appendectomy      ALLERGIES:  No Known Allergies    MEDICATIONS:  STANDING MEDICATIONS  ATENolol  Tablet 25 milliGRAM(s) Oral daily  carbidopa/levodopa  10/100 1 Tablet(s) Oral three times a day  finasteride 5 milliGRAM(s) Oral daily  pantoprazole    Tablet 40 milliGRAM(s) Oral before breakfast  QUEtiapine 25 milliGRAM(s) Oral daily  tamsulosin 0.4 milliGRAM(s) Oral at bedtime    PRN MEDICATIONS    VITALS:   T(F): 96.9  HR: 68  BP: 119/59  RR: 18  SpO2: --    LABS:                        10.1   6.07  )-----------( 194      ( 08 Feb 2022 11:00 )             31.0     02-07    138  |  103  |  18  ----------------------------<  103<H>  4.0   |  26  |  0.8    Ca    8.2<L>      07 Feb 2022 07:40                    RADIOLOGY:    PHYSICAL EXAM:  GEN: No acute distress  LUNGS: Clear to auscultation bilaterally   HEART: S1/S2 present. RRR.   ABD/ GI: Soft, non-tender, non-distended. Bowel sounds present  EXT: NC/NC/NE/2+PP/NAVA  NEURO: AAOX3

## 2022-02-11 NOTE — PROGRESS NOTE ADULT - PROVIDER SPECIALTY LIST ADULT
Gastroenterology
Colorectal Surgery
Hospitalist
Hospitalist
Internal Medicine
Colorectal Surgery
Colorectal Surgery
Hospitalist

## 2022-02-11 NOTE — PROGRESS NOTE ADULT - ASSESSMENT
86 year old M with a PMx of an unclear abdominal sx history (has had abdominal surgery in [facility] many years ago and 1 in [place] about 15 years ago as well, but unknown which hospital), SBO managed medically at [facility] 1/2022, HTN, Questionable history of parkinson's disease (patient takes sinemet at home), BPH, B12 deficiency, GERD, presents for a two week history of generalized weakness and dizziness.    #Acute on chronic anemia - no overt evidence of bleeding s/p EGD/Cf on 2/10  - H&H baseline - 8-9  - on admission 5.4 -> 1 unit -> 8.5. MCV 73  - Iron studies not consistent /w iron deficiency anemia  - CF: < from: EGD-Colonoscopy (02.10.22 @ 13:30) >   Grade/Stage III internal and external hemorrhoids.    - and The colon was otherwise unremarkable.     < end of copied text >    - EGD: < from: EGD-Colonoscopy (02.10.22 @ 13:30) >   Normal esophagus.    Gastricprevious surgery. (Biopsy).    Normal mucosa in the first part of the duodenum and second part of the  duodenum. (Biopsy).     < end of copied text >        Rec  - target hb >8  - await pathology results  - follow up in [facility] at next available appointment  - outpatient video capsule endoscopy  - Follow up with our [facility] located at [address] [place] [address]. Phone Number: [phone]      #Dilated CBD and PD  - recommend MRI pancreatic protocol with MRCP

## 2022-02-11 NOTE — PROGRESS NOTE ADULT - REASON FOR ADMISSION
Acute on Chronic Anemia, Abdominal Pain

## 2022-02-15 LAB — SURGICAL PATHOLOGY STUDY: SIGNIFICANT CHANGE UP

## 2022-02-23 ENCOUNTER — APPOINTMENT (OUTPATIENT)
Dept: SURGERY | Facility: CLINIC | Age: 87
End: 2022-02-23

## 2022-02-23 ENCOUNTER — APPOINTMENT (OUTPATIENT)
Dept: SURGERY | Facility: CLINIC | Age: 87
End: 2022-02-23
Payer: MEDICARE

## 2022-02-23 VITALS
WEIGHT: 105 LBS | OXYGEN SATURATION: 97 % | SYSTOLIC BLOOD PRESSURE: 136 MMHG | BODY MASS INDEX: 16.48 KG/M2 | DIASTOLIC BLOOD PRESSURE: 70 MMHG | HEIGHT: 67 IN | TEMPERATURE: 97.2 F | HEART RATE: 86 BPM

## 2022-02-23 DIAGNOSIS — Z87.19 PERSONAL HISTORY OF OTHER DISEASES OF THE DIGESTIVE SYSTEM: ICD-10-CM

## 2022-02-23 DIAGNOSIS — K83.8 OTHER SPECIFIED DISEASES OF BILIARY TRACT: ICD-10-CM

## 2022-02-23 DIAGNOSIS — D64.9 ANEMIA, UNSPECIFIED: ICD-10-CM

## 2022-02-23 DIAGNOSIS — K80.20 CALCULUS OF GALLBLADDER W/OUT CHOLECYSTITIS W/OUT OBSTRUCTION: ICD-10-CM

## 2022-02-23 PROCEDURE — 99214 OFFICE O/P EST MOD 30 MIN: CPT

## 2022-02-23 NOTE — ASSESSMENT
[FreeTextEntry1] : 87yo wheelchair bound male with multiple medical problems, including recent admission for SBO and recent admission for weakness, found to have anemia of unknown origin, cholelithiasis and dilated CBD. \par -small bowel obstruction - explained to patient and family member that bowel obstruction is likely secondary to intra-abdominal adhesions related to previous gastric surgery, explained why surgery is avoided if possible, instructed to return to ER if symptoms recur\par -asymptomatic cholelithiasis - no surgical recommendation at this time, considering asymptomatic nature and high risk patient profile, gallstones unlikely contributing to anemia; would consider stent or perc drainage PRN \par -dilated CBD - EGD reviewed, would recommend follow up with GI for MRCP and possible EUS\par -all questions answered\par -return to office PRN\par

## 2022-02-23 NOTE — HISTORY OF PRESENT ILLNESS
[de-identified] : 87yo male with complex PMHx including HTN, CAD, ?atrial fib on anticoagulation, ?parkinson's, BPH, GERD, ?gastrectomy for UGI bleed >10 years ago ([facility], >10 years ago), recent admission for SBO ([facility] 1/2022), recent admission for weakness and dizziness, found to have anemia Hb 5.9 of unknown origin. During his work up, he was found to have gallstones and was referred to surgery for further evaluation. He also underwent EGD and colonscopy during his hospitalization, which was reportedly normal. At this time, patient states that he continues to feel weak overall, unable to take more than a few steps and not eating well. He is no longer vomiting, +bowel function. No abdominal pain at this time. No nausea, chest pain or shortness of breath.

## 2022-02-23 NOTE — PHYSICAL EXAM
[Normal Breath Sounds] : Normal breath sounds [Normal Heart Sounds] : normal heart sounds [Alert] : alert [Calm] : calm [de-identified] : no acute distress [de-identified] : s [de-identified] : scaphoid, midline well-healed scar, non-tender, non-distended [de-identified] : wheel-chair bound

## 2022-02-23 NOTE — DATA REVIEWED
[FreeTextEntry1] : CT A/P 2/5/2022 - No evidence for retroperitoneal hematoma; Biliary ductal dilatation, slightly improved from prior\par \par CT A/P 2/3/2022 - Focal diminished attenuation of the left lower renal cortex. This may reflect evidence of pyelonephritis in the appropriate clinical setting. This may also reflect sequela of prior insult; Mild right hydronephrosis and proximal hydroureter. No definite obstructing calculus is seen with mid and distal right ureter appearing normal in caliber; Enlarged heterogeneous prostate; 9 mm nodule in the right lower lobe. Nonemergent PET CT may be of benefit; Tree-in-bud nodularity in the right middle and lower lobes may reflect sequela of an infectious process.\par \par EGD/colonoscopy 2/10/2022 - unremarkable

## 2022-02-23 NOTE — REASON FOR VISIT
[Consultation] : a consultation visit [Family Member] : family member [FreeTextEntry1] : gallstones [TWNoteComboBox1] : Lily

## 2022-02-24 DIAGNOSIS — K64.4 RESIDUAL HEMORRHOIDAL SKIN TAGS: ICD-10-CM

## 2022-02-24 DIAGNOSIS — R64 CACHEXIA: ICD-10-CM

## 2022-02-24 DIAGNOSIS — K21.9 GASTRO-ESOPHAGEAL REFLUX DISEASE WITHOUT ESOPHAGITIS: ICD-10-CM

## 2022-02-24 DIAGNOSIS — K64.2 THIRD DEGREE HEMORRHOIDS: ICD-10-CM

## 2022-02-24 DIAGNOSIS — N40.1 BENIGN PROSTATIC HYPERPLASIA WITH LOWER URINARY TRACT SYMPTOMS: ICD-10-CM

## 2022-02-24 DIAGNOSIS — K83.8 OTHER SPECIFIED DISEASES OF BILIARY TRACT: ICD-10-CM

## 2022-02-24 DIAGNOSIS — F02.80 DEMENTIA IN OTHER DISEASES CLASSIFIED ELSEWHERE, UNSPECIFIED SEVERITY, WITHOUT BEHAVIORAL DISTURBANCE, PSYCHOTIC DISTURBANCE, MOOD DISTURBANCE, AND ANXIETY: ICD-10-CM

## 2022-02-24 DIAGNOSIS — E53.8 DEFICIENCY OF OTHER SPECIFIED B GROUP VITAMINS: ICD-10-CM

## 2022-02-24 DIAGNOSIS — Z79.899 OTHER LONG TERM (CURRENT) DRUG THERAPY: ICD-10-CM

## 2022-02-24 DIAGNOSIS — R11.2 NAUSEA WITH VOMITING, UNSPECIFIED: ICD-10-CM

## 2022-02-24 DIAGNOSIS — Z98.84 BARIATRIC SURGERY STATUS: ICD-10-CM

## 2022-02-24 DIAGNOSIS — R10.9 UNSPECIFIED ABDOMINAL PAIN: ICD-10-CM

## 2022-02-24 DIAGNOSIS — E78.5 HYPERLIPIDEMIA, UNSPECIFIED: ICD-10-CM

## 2022-02-24 DIAGNOSIS — D64.9 ANEMIA, UNSPECIFIED: ICD-10-CM

## 2022-02-24 DIAGNOSIS — G20 PARKINSON'S DISEASE: ICD-10-CM

## 2022-02-24 DIAGNOSIS — K86.89 OTHER SPECIFIED DISEASES OF PANCREAS: ICD-10-CM

## 2022-02-24 DIAGNOSIS — D50.9 IRON DEFICIENCY ANEMIA, UNSPECIFIED: ICD-10-CM

## 2022-02-24 DIAGNOSIS — R91.8 OTHER NONSPECIFIC ABNORMAL FINDING OF LUNG FIELD: ICD-10-CM

## 2022-02-24 DIAGNOSIS — I95.1 ORTHOSTATIC HYPOTENSION: ICD-10-CM

## 2022-02-24 DIAGNOSIS — I10 ESSENTIAL (PRIMARY) HYPERTENSION: ICD-10-CM

## 2022-02-24 DIAGNOSIS — K80.20 CALCULUS OF GALLBLADDER WITHOUT CHOLECYSTITIS WITHOUT OBSTRUCTION: ICD-10-CM

## 2022-03-09 ENCOUNTER — APPOINTMENT (OUTPATIENT)
Dept: SURGERY | Facility: CLINIC | Age: 87
End: 2022-03-09
Payer: MEDICARE

## 2022-03-09 VITALS
SYSTOLIC BLOOD PRESSURE: 118 MMHG | HEART RATE: 88 BPM | TEMPERATURE: 97.3 F | HEIGHT: 67 IN | BODY MASS INDEX: 16.17 KG/M2 | DIASTOLIC BLOOD PRESSURE: 84 MMHG | WEIGHT: 103 LBS

## 2022-03-09 DIAGNOSIS — K56.609 UNSPECIFIED INTESTINAL OBSTRUCTION, UNSPECIFIED AS TO PARTIAL VERSUS COMPLETE OBSTRUCTION: ICD-10-CM

## 2022-03-09 PROCEDURE — 99213 OFFICE O/P EST LOW 20 MIN: CPT

## 2022-03-15 PROBLEM — K56.609 SMALL BOWEL OBSTRUCTION: Status: ACTIVE | Noted: 2022-01-31

## 2022-03-15 NOTE — PHYSICAL EXAM
[Abdomen Masses] : No abdominal masses [Abdomen Tenderness] : ~T No ~M abdominal tenderness [No HSM] : no hepatosplenomegaly [Respiratory Effort] : normal respiratory effort [Normal Rate and Rhythm] : normal rate and rhythm [de-identified] : well healed midline incision [de-identified] : awake, alert and in no acute distress

## 2022-03-15 NOTE — ASSESSMENT
[FreeTextEntry1] : 86M follow up for SBO\par \par Patient has recovered well. We discussed recurrent symptoms and the possibility of surgical intervention. He will contact our office for recurrent symptoms

## 2022-03-15 NOTE — HISTORY OF PRESENT ILLNESS
[FreeTextEntry1] : Patient evaluated via [employer] . Pt admitted to emergency room for anemia on 2/4/22. Pt is being seen  today for follow up. Pt reports feeling "the same."  PT reports + hemorrhoids are painful but - for bleeding or itching at this time. Pt reports a good appetite- eating 3 small meals a day + snacks. Pt reports 1 soft bm a day and is taking a stool softener and a laxative. Pt denies abdominal pain, N/V unexpected weight loss, fevers or chills. \par \par Pt reports following up with a hematologist for his anemia. As per pt there was blood found in his stool in a previous sample. new labs were taken on Monday and are pending.

## 2023-01-01 NOTE — PRE-OP CHECKLIST - PATIENT SENT TO
"  [facility]    Procedure Note        The  Peripherally Inserted Central Line Catheter (PICC) was removed on 2023, 2:33 PM because it was no longer working or in central position.      Cale Breen  MRN# [id number]   Time and date of note: 2023, 2:33 PM   Safety Check A final verification (\"time out\") was performed to ensure the correct patient, and agreement regarding Peripherally Inserted Central Line Catheter (PICC) removal.   Comments: The Peripherally Inserted Central Line Catheter (PICC) was clamped and removed slowly. Catheter intact without evidence of clot. EBL <0.1 mL.      This procedure was performed without difficulty and he tolerated the procedure well with no immediate complications.    This procedure was performed by this author.    Tri Grace, CARINE, NNP-BC, 23 2:33 PM    Advanced Practice Providers  [facility]      " endoscopy

## 2025-07-09 NOTE — ED ADULT NURSE NOTE - NS_SISCREENINGSR_GEN_ALL_ED
DM II, checks FS every other day , average 120's-130's, does not recall last A1C , managed by PCP
Negative